# Patient Record
Sex: FEMALE | Race: WHITE | NOT HISPANIC OR LATINO | Employment: OTHER | URBAN - NONMETROPOLITAN AREA
[De-identification: names, ages, dates, MRNs, and addresses within clinical notes are randomized per-mention and may not be internally consistent; named-entity substitution may affect disease eponyms.]

---

## 2017-04-10 ENCOUNTER — TELEPHONE (OUTPATIENT)
Dept: FAMILY MEDICINE CLINIC | Facility: CLINIC | Age: 42
End: 2017-04-10

## 2017-04-10 NOTE — TELEPHONE ENCOUNTER
----- Message from David Magdaleno sent at 4/10/2017  1:34 PM CDT -----  Contact: [phone]  PATIENT HAS THRUSH IN MOUTH FROM ANTIBIOTICS AND WANTS TO KNOW IF YOU WILL CALL IN FABIANA CORONA

## 2017-05-12 ENCOUNTER — HOSPITAL ENCOUNTER (OUTPATIENT)
Facility: HOSPITAL | Age: 42
Setting detail: HOSPITAL OUTPATIENT SURGERY
End: 2017-05-12
Attending: SURGERY | Admitting: SURGERY

## 2017-05-12 ENCOUNTER — APPOINTMENT (OUTPATIENT)
Dept: PREADMISSION TESTING | Facility: HOSPITAL | Age: 42
End: 2017-05-12

## 2017-05-12 ENCOUNTER — CONSULT (OUTPATIENT)
Dept: SURGERY | Facility: CLINIC | Age: 42
End: 2017-05-12

## 2017-05-12 VITALS
OXYGEN SATURATION: 100 % | WEIGHT: 210 LBS | HEIGHT: 63 IN | RESPIRATION RATE: 18 BRPM | BODY MASS INDEX: 37.21 KG/M2 | HEART RATE: 88 BPM | SYSTOLIC BLOOD PRESSURE: 120 MMHG | DIASTOLIC BLOOD PRESSURE: 74 MMHG

## 2017-05-12 VITALS
WEIGHT: 213 LBS | DIASTOLIC BLOOD PRESSURE: 78 MMHG | BODY MASS INDEX: 39.2 KG/M2 | SYSTOLIC BLOOD PRESSURE: 124 MMHG | HEIGHT: 62 IN

## 2017-05-12 DIAGNOSIS — K80.12 CALCULUS OF GALLBLADDER WITH ACUTE ON CHRONIC CHOLECYSTITIS WITHOUT OBSTRUCTION: Primary | ICD-10-CM

## 2017-05-12 DIAGNOSIS — K80.12 CALCULUS OF GALLBLADDER WITH ACUTE ON CHRONIC CHOLECYSTITIS WITHOUT OBSTRUCTION: ICD-10-CM

## 2017-05-12 LAB
ALBUMIN SERPL-MCNC: 4 G/DL (ref 3.4–4.8)
ALBUMIN/GLOB SERPL: 1.4 G/DL (ref 1.1–1.8)
ALP SERPL-CCNC: 180 U/L (ref 38–126)
ALT SERPL W P-5'-P-CCNC: 186 U/L (ref 9–52)
ANION GAP SERPL CALCULATED.3IONS-SCNC: 9 MMOL/L (ref 5–15)
AST SERPL-CCNC: 39 U/L (ref 14–36)
BILIRUB SERPL-MCNC: 0.3 MG/DL (ref 0.2–1.3)
BUN BLD-MCNC: 11 MG/DL (ref 7–21)
BUN/CREAT SERPL: 11.7 (ref 7–25)
CALCIUM SPEC-SCNC: 9.1 MG/DL (ref 8.4–10.2)
CHLORIDE SERPL-SCNC: 105 MMOL/L (ref 95–110)
CO2 SERPL-SCNC: 24 MMOL/L (ref 22–31)
CREAT BLD-MCNC: 0.94 MG/DL (ref 0.5–1)
GFR SERPL CREATININE-BSD FRML MDRD: 65 ML/MIN/1.73 (ref 58–135)
GLOBULIN UR ELPH-MCNC: 2.9 GM/DL (ref 2.3–3.5)
GLUCOSE BLD-MCNC: 93 MG/DL (ref 60–100)
POTASSIUM BLD-SCNC: 4.2 MMOL/L (ref 3.5–5.1)
PROT SERPL-MCNC: 6.9 G/DL (ref 6.3–8.6)
SODIUM BLD-SCNC: 138 MMOL/L (ref 137–145)

## 2017-05-12 PROCEDURE — 80053 COMPREHEN METABOLIC PANEL: CPT | Performed by: SURGERY

## 2017-05-12 PROCEDURE — 93010 ELECTROCARDIOGRAM REPORT: CPT | Performed by: INTERNAL MEDICINE

## 2017-05-12 PROCEDURE — 99203 OFFICE O/P NEW LOW 30 MIN: CPT | Performed by: SURGERY

## 2017-05-12 PROCEDURE — 36415 COLL VENOUS BLD VENIPUNCTURE: CPT

## 2017-05-12 PROCEDURE — 93005 ELECTROCARDIOGRAM TRACING: CPT

## 2017-05-12 RX ORDER — VALACYCLOVIR HYDROCHLORIDE 1 G/1
500 TABLET, FILM COATED ORAL 2 TIMES DAILY PRN
COMMUNITY
Start: 2013-03-05

## 2017-05-12 RX ORDER — BUPROPION HYDROCHLORIDE 300 MG/1
300 TABLET ORAL DAILY
COMMUNITY
End: 2018-09-17 | Stop reason: SDUPTHER

## 2017-05-12 RX ORDER — CLONAZEPAM 2 MG/1
2 TABLET ORAL 2 TIMES DAILY PRN
Refills: 3 | COMMUNITY
Start: 2017-04-11 | End: 2019-12-04

## 2017-05-12 RX ORDER — SODIUM CHLORIDE, SODIUM LACTATE, POTASSIUM CHLORIDE, CALCIUM CHLORIDE 600; 310; 30; 20 MG/100ML; MG/100ML; MG/100ML; MG/100ML
100 INJECTION, SOLUTION INTRAVENOUS CONTINUOUS
Status: CANCELLED | OUTPATIENT
Start: 2017-05-16

## 2017-05-12 RX ORDER — IBUPROFEN 600 MG/1
600 TABLET ORAL EVERY 8 HOURS PRN
COMMUNITY
End: 2017-11-15 | Stop reason: SDUPTHER

## 2017-05-12 RX ORDER — DICYCLOMINE HCL 20 MG
20 TABLET ORAL EVERY 6 HOURS
COMMUNITY
End: 2017-05-12

## 2017-05-12 RX ORDER — TEMAZEPAM 15 MG/1
15 CAPSULE ORAL NIGHTLY PRN
Refills: 3 | COMMUNITY
Start: 2017-04-11 | End: 2019-12-04

## 2017-05-12 RX ORDER — ACYCLOVIR 50 MG/G
CREAM TOPICAL AS NEEDED
COMMUNITY
End: 2017-07-21

## 2017-05-12 RX ORDER — SODIUM CHLORIDE 0.9 % (FLUSH) 0.9 %
1-10 SYRINGE (ML) INJECTION AS NEEDED
Status: CANCELLED | OUTPATIENT
Start: 2017-05-16

## 2017-05-16 ENCOUNTER — OFFICE VISIT (OUTPATIENT)
Dept: FAMILY MEDICINE CLINIC | Facility: CLINIC | Age: 42
End: 2017-05-16

## 2017-05-16 VITALS
BODY MASS INDEX: 37.39 KG/M2 | HEART RATE: 92 BPM | WEIGHT: 211 LBS | OXYGEN SATURATION: 97 % | DIASTOLIC BLOOD PRESSURE: 76 MMHG | TEMPERATURE: 97.2 F | SYSTOLIC BLOOD PRESSURE: 120 MMHG | HEIGHT: 63 IN

## 2017-05-16 DIAGNOSIS — R23.4 SCAB: Primary | ICD-10-CM

## 2017-05-16 DIAGNOSIS — F41.9 ANXIETY: ICD-10-CM

## 2017-05-16 PROCEDURE — 99213 OFFICE O/P EST LOW 20 MIN: CPT | Performed by: FAMILY MEDICINE

## 2017-05-16 RX ORDER — CEPHALEXIN 250 MG/1
250 CAPSULE ORAL 4 TIMES DAILY
Qty: 20 CAPSULE | Refills: 0 | Status: SHIPPED | OUTPATIENT
Start: 2017-05-16 | End: 2017-07-21

## 2017-06-05 ENCOUNTER — TELEPHONE (OUTPATIENT)
Dept: FAMILY MEDICINE CLINIC | Facility: CLINIC | Age: 42
End: 2017-06-05

## 2017-06-05 NOTE — TELEPHONE ENCOUNTER
PATIENT SAW DR GRIJALVA ABOUT GALLBLADDER. NEEDS CARIOLOGY CLEARANCE FOR SURGERY. APPOINTMENT WITH [facility] IS 6-24-17.    IN A LOT OF PAIN. WHAT SHOULD SHE DO UNTIL THEN?

## 2017-06-19 ENCOUNTER — APPOINTMENT (OUTPATIENT)
Dept: LAB | Facility: HOSPITAL | Age: 42
End: 2017-06-19

## 2017-06-19 ENCOUNTER — PROCEDURE VISIT (OUTPATIENT)
Dept: OBSTETRICS AND GYNECOLOGY | Facility: CLINIC | Age: 42
End: 2017-06-19

## 2017-06-19 VITALS
DIASTOLIC BLOOD PRESSURE: 78 MMHG | SYSTOLIC BLOOD PRESSURE: 118 MMHG | HEIGHT: 63 IN | BODY MASS INDEX: 38.98 KG/M2 | WEIGHT: 220 LBS

## 2017-06-19 DIAGNOSIS — Z11.4 ENCOUNTER FOR SCREENING FOR HIV: ICD-10-CM

## 2017-06-19 DIAGNOSIS — Z11.3 SCREENING EXAMINATION FOR STD (SEXUALLY TRANSMITTED DISEASE): Primary | ICD-10-CM

## 2017-06-19 DIAGNOSIS — R63.5 WEIGHT GAIN: Primary | ICD-10-CM

## 2017-06-19 DIAGNOSIS — R23.2 HOT FLASHES: ICD-10-CM

## 2017-06-19 DIAGNOSIS — Z12.31 ENCOUNTER FOR SCREENING MAMMOGRAM FOR MALIGNANT NEOPLASM OF BREAST: ICD-10-CM

## 2017-06-19 LAB
CANDIDA ALBICANS: NEGATIVE
GARDNERELLA VAGINALIS: POSITIVE
TRICHOMONAS VAGINALIS PCR: NEGATIVE
TSH SERPL DL<=0.05 MIU/L-ACNC: 1.9 MIU/ML (ref 0.46–4.68)

## 2017-06-19 PROCEDURE — 87510 GARDNER VAG DNA DIR PROBE: CPT | Performed by: NURSE PRACTITIONER

## 2017-06-19 PROCEDURE — 87340 HEPATITIS B SURFACE AG IA: CPT | Performed by: NURSE PRACTITIONER

## 2017-06-19 PROCEDURE — 87591 N.GONORRHOEAE DNA AMP PROB: CPT | Performed by: NURSE PRACTITIONER

## 2017-06-19 PROCEDURE — 86592 SYPHILIS TEST NON-TREP QUAL: CPT | Performed by: NURSE PRACTITIONER

## 2017-06-19 PROCEDURE — 36415 COLL VENOUS BLD VENIPUNCTURE: CPT | Performed by: NURSE PRACTITIONER

## 2017-06-19 PROCEDURE — 84443 ASSAY THYROID STIM HORMONE: CPT | Performed by: NURSE PRACTITIONER

## 2017-06-19 PROCEDURE — G0432 EIA HIV-1/HIV-2 SCREEN: HCPCS | Performed by: NURSE PRACTITIONER

## 2017-06-19 PROCEDURE — 99213 OFFICE O/P EST LOW 20 MIN: CPT | Performed by: NURSE PRACTITIONER

## 2017-06-19 PROCEDURE — 86696 HERPES SIMPLEX TYPE 2 TEST: CPT | Performed by: NURSE PRACTITIONER

## 2017-06-19 PROCEDURE — 87660 TRICHOMONAS VAGIN DIR PROBE: CPT | Performed by: NURSE PRACTITIONER

## 2017-06-19 PROCEDURE — 86803 HEPATITIS C AB TEST: CPT | Performed by: NURSE PRACTITIONER

## 2017-06-19 PROCEDURE — 87480 CANDIDA DNA DIR PROBE: CPT | Performed by: NURSE PRACTITIONER

## 2017-06-19 PROCEDURE — 86695 HERPES SIMPLEX TYPE 1 TEST: CPT | Performed by: NURSE PRACTITIONER

## 2017-06-19 PROCEDURE — 87491 CHLMYD TRACH DNA AMP PROBE: CPT | Performed by: NURSE PRACTITIONER

## 2017-06-19 RX ORDER — METRONIDAZOLE 500 MG/1
500 TABLET ORAL 2 TIMES DAILY
Qty: 14 TABLET | Refills: 0 | Status: SHIPPED | OUTPATIENT
Start: 2017-06-19 | End: 2017-06-26

## 2017-06-19 NOTE — PROGRESS NOTES
"Subjective   History of Present Illness    Chioma Lin is a 41 y.o. female who presents for STI testing. She has not had any new sexual partners but unsure of her partner's faithfulness. She denies vaginal discharge, irritation, pruritus. She is also having worsened hot flashes since the previous visit one year ago. They are infrequent, but the Hot flashes are so bad sometimes that they induce nausea. She is weary of the risks involved with HRT, and currently already taking zoloft daily. She does not desire to begin HRT at this time.       Current contraception: Tubal ligation  Sexually active?: Yes  Postmenopausal?: Yes  HRT?: no  Hysterectomy?: no    Date last pap:   History of abnormal Pap smear: no  Date of last mammogram: never  History of abnormal mammogram: no    /78  Ht 63\" (160 cm)  Wt 220 lb (99.8 kg)  LMP  (LMP Unknown)  Breastfeeding? No  BMI 38.97 kg/m2    Past Medical History:   Diagnosis Date   • Abdominal pain    • Acid reflux    • Acute sinusitis    • Allergic rhinitis     Due to Pollen   • Asthma    • Astigmatism    • Cardiomyopathy     post partum   • CHF (congestive heart failure)    • Chronic pain    • Common cold    • Constipation    • Depressive disorder    • Diarrhea    • Dysfunction of eustachian tube    • Encounter for gynecological examination with abnormal finding    • Encounter for screening for infections with predominantly sexual mode of transmission    • Encounter for screening mammogram for malignant neoplasm of breast    • Excessive cerumen in ear canal    • Hypertensive disorder     hx   • Irregular menstruation    • Menopausal flushing    • Mood disorder     Severe   • Myopia    • Nausea and vomiting    • Otalgia     Unspecified ear   • Presbyopia    • Sebaceous cyst    • Vaginal discharge    • Vaginal odor    • Venereal disease screening        Past Surgical History:   Procedure Laterality Date   •  SECTION  2005   • INJECTION OF " MEDICATION  04/04/2016    Phenergan   • OTHER SURGICAL HISTORY  11/25/2013    Removal of impacted cerumen   • PAP SMEAR  2013   • TUBAL ABDOMINAL LIGATION         The following portions of the patient's history were reviewed and updated as appropriate: allergies, current medications, past family history, past medical history, past social history, past surgical history and problem list.    Review of Systems    Constitutional:  No fatigue, No weight loss, No weight gain, No fever and No chills   Respiratory: No dyspnea, No cough, No hemopytysis, No wheezing and No pleuritic pain   Cardiovascular: No chest pain, No palpitations, No arrhythmia, No orthopnea, No noctural dyspnea, No edema and No claudication   Breasts: No discharge from nipple, No breast tenderness and No breast mass   Gastrointestinal: No loss of appetite, No dysphagia, No abdominal pain, No nausea, No vomiting, No change in bowel habits, No diarrhea, No constipation and No blood in stool   Genitourinary: No increased frequency of urination, No dysuria, No hematuria, No nocturia, No urinary incontinence, No vaginal discharge, No abnormal vaginal bleeding, No pelvic pain, No menstrual problem and Menopausal problem   Skin: No skin rash, No skin lesion, No dry skin, No pruritus and No nail problem   Neurologic: No headache, No dizziness, No lightheadedness, No syncope, No vertigo, No weakness, No numbness, No tremor and No paresthesia   Psychiatric: No difficulty sleeping, No mood swings, No feeling anxious, No confusion and No memory loss          Objective   Physical Exam    General:  Alert and oriented x 3, Cooperative, Well developed & well nourished and No acute distress   Genitourinary: Vulva normal, vaginal mucosa normal, bladder nondistended and nontender, cervix normal, uterus normal size and nontender, no adnexal/pelvic tenderness or masses, Bartholin's/[place]/Urethral gland WNL, Vaginal discharge   Skin: Skin warm and dry and Pattern of hair  growth normal       Assessment/Plan   Chioma was seen today for gynecologic exam and personal problem.    Diagnoses and all orders for this visit:    Screening examination for STD (sexually transmitted disease)  -     Gardnerella vaginalis, Trichomonas vaginalis, Candida albicans, PCR  -     Chlamydia trachomatis, Neisseria gonorrhoeae, PCR  -     Hepatitis B Surface Antigen  -     Hepatitis C Antibody  -     HIV-1 & HIV-2 Antibodies  -     HSV 1 & 2 - Specific Antibody, IgG  -     RPR    Encounter for screening for HIV   -     HIV-1 & HIV-2 Antibodies    Hot flashes    Encounter for screening mammogram for malignant neoplasm of breast  -     Mammo Screening Digital Tomosynthesis Bilateral With CAD; Future      All questions answered.  Labs today.  Will rule out vaginal infections.  Mammogram.  Discussed sexual activity and couseled on sexual health. Recommended condoms for prevention of sexually transmitted diseases.  Follow up in 1 year.

## 2017-06-20 LAB
HSV1 IGG SER IA-ACNC: 29.5 INDEX (ref 0–0.9)
HSV2 IGG SER IA-ACNC: 10.4 INDEX (ref 0–0.9)

## 2017-06-21 LAB
C TRACH RRNA CVX QL NAA+PROBE: NOT DETECTED
HBV SURFACE AG SERPL QL IA: NEGATIVE
HCV AB SER DONR QL: NEGATIVE
HIV1+2 AB SER QL: NEGATIVE
N GONORRHOEA RRNA SPEC QL NAA+PROBE: NOT DETECTED

## 2017-06-23 LAB — RPR SER QL: NORMAL

## 2017-06-29 ENCOUNTER — TELEPHONE (OUTPATIENT)
Dept: SURGERY | Facility: CLINIC | Age: 42
End: 2017-06-29

## 2017-06-29 NOTE — TELEPHONE ENCOUNTER
PT WAS WAITING ON CLEARANCE FROM DR BAKER FOR HER OWN [facility].   HE HAS CLEARED HER AND SHE WOULD LIKE TO [facility] SURGERY IF DR GRIJALVA WILL DO IT

## 2017-07-06 ENCOUNTER — TELEPHONE (OUTPATIENT)
Dept: FAMILY MEDICINE CLINIC | Facility: CLINIC | Age: 42
End: 2017-07-06

## 2017-07-06 RX ORDER — DICYCLOMINE HCL 20 MG
20 TABLET ORAL EVERY 6 HOURS
Qty: 120 TABLET | Refills: 0 | Status: SHIPPED | OUTPATIENT
Start: 2017-07-06 | End: 2017-08-05 | Stop reason: SDUPTHER

## 2017-07-06 RX ORDER — OMEPRAZOLE 40 MG/1
40 CAPSULE, DELAYED RELEASE ORAL DAILY
Qty: 30 CAPSULE | Refills: 0 | Status: SHIPPED | OUTPATIENT
Start: 2017-07-06 | End: 2017-08-05 | Stop reason: SDUPTHER

## 2017-07-06 NOTE — TELEPHONE ENCOUNTER
PT SEES DR. GRIJALVA ON July 17 FOR GALLBLADDER AND WANTS TO KNOW IF YOU WILL SEND IN RX FOR BENTYL 20 MG, 1 Q 6H PRN AND REFILL  PRILOSEC 40 MG, 1 QD  SHE WAS TOLD TO CONTACT PCP FOR THIS.  CHLOE MONTENEGRO, CALL AND LET PT KNOW

## 2017-07-21 ENCOUNTER — OFFICE VISIT (OUTPATIENT)
Dept: SURGERY | Facility: CLINIC | Age: 42
End: 2017-07-21

## 2017-07-21 ENCOUNTER — APPOINTMENT (OUTPATIENT)
Dept: PREADMISSION TESTING | Facility: HOSPITAL | Age: 42
End: 2017-07-21

## 2017-07-21 VITALS
BODY MASS INDEX: 37.21 KG/M2 | HEIGHT: 63 IN | WEIGHT: 210 LBS | SYSTOLIC BLOOD PRESSURE: 102 MMHG | DIASTOLIC BLOOD PRESSURE: 74 MMHG

## 2017-07-21 DIAGNOSIS — K80.20 GALLSTONES: ICD-10-CM

## 2017-07-21 DIAGNOSIS — K80.20 GALLSTONES: Primary | ICD-10-CM

## 2017-07-21 LAB
ALBUMIN SERPL-MCNC: 4.3 G/DL (ref 3.4–4.8)
ALBUMIN/GLOB SERPL: 1.3 G/DL (ref 1.1–1.8)
ALP SERPL-CCNC: 110 U/L (ref 38–126)
ALT SERPL W P-5'-P-CCNC: 34 U/L (ref 9–52)
ANION GAP SERPL CALCULATED.3IONS-SCNC: 10 MMOL/L (ref 5–15)
AST SERPL-CCNC: 24 U/L (ref 14–36)
BILIRUB SERPL-MCNC: 0.3 MG/DL (ref 0.2–1.3)
BUN BLD-MCNC: 11 MG/DL (ref 7–21)
BUN/CREAT SERPL: 11.6 (ref 7–25)
CALCIUM SPEC-SCNC: 8.7 MG/DL (ref 8.4–10.2)
CHLORIDE SERPL-SCNC: 102 MMOL/L (ref 95–110)
CO2 SERPL-SCNC: 24 MMOL/L (ref 22–31)
CREAT BLD-MCNC: 0.95 MG/DL (ref 0.5–1)
GFR SERPL CREATININE-BSD FRML MDRD: 64 ML/MIN/1.73 (ref 58–135)
GLOBULIN UR ELPH-MCNC: 3.3 GM/DL (ref 2.3–3.5)
GLUCOSE BLD-MCNC: 91 MG/DL (ref 60–100)
POTASSIUM BLD-SCNC: 4.4 MMOL/L (ref 3.5–5.1)
PROT SERPL-MCNC: 7.6 G/DL (ref 6.3–8.6)
SODIUM BLD-SCNC: 136 MMOL/L (ref 137–145)

## 2017-07-21 PROCEDURE — 80053 COMPREHEN METABOLIC PANEL: CPT | Performed by: SURGERY

## 2017-07-21 PROCEDURE — 36415 COLL VENOUS BLD VENIPUNCTURE: CPT

## 2017-07-21 PROCEDURE — 99213 OFFICE O/P EST LOW 20 MIN: CPT | Performed by: SURGERY

## 2017-07-21 RX ORDER — SODIUM CHLORIDE 0.9 % (FLUSH) 0.9 %
1-10 SYRINGE (ML) INJECTION AS NEEDED
Status: CANCELLED | OUTPATIENT
Start: 2017-07-25

## 2017-07-21 RX ORDER — SODIUM CHLORIDE, SODIUM LACTATE, POTASSIUM CHLORIDE, CALCIUM CHLORIDE 600; 310; 30; 20 MG/100ML; MG/100ML; MG/100ML; MG/100ML
100 INJECTION, SOLUTION INTRAVENOUS CONTINUOUS
Status: CANCELLED | OUTPATIENT
Start: 2017-07-25

## 2017-07-21 NOTE — PROGRESS NOTES
Chief Complaint   Patient presents with   • Follow-up     Recheck abdominal pain.   • Nausea        HPI  This woman has been seen on multiple occasions for right upper quadrant abdominal pain and gallstones.  She has been scheduled on 2 separate occasions for cholecystectomy but has canceled each time.  She's had persistent pain in the right upper quadrant that she describes as sharp and associated with nausea. Pertinent negatives include no anorexia, arthralgias, constipation, diarrhea, dysuria, fever, frequency, headaches, hematuria, myalgias or vomiting. Nothing aggravates the pain. The pain is relieved by nothing.     Past Medical History:   Diagnosis Date   • Abdominal pain    • Acid reflux                  • Asthma    • Astigmatism    • Cardiomyopathy     post partum   • CHF (congestive heart failure)    • Chronic pain         • Constipation    • Depressive disorder    • Diarrhea    • Dysfunction of eustachian tube                        • Hypertensive disorder     hx   • Irregular menstruation    • Menopausal flushing    • Mood disorder     Severe   • Myopia         • Otalgia     Unspecified ear   • Presbyopia                            Past Surgical History:   Procedure Laterality Date   •  SECTION  2005             • OTHER SURGICAL HISTORY  2013    Removal of impacted cerumen         • TUBAL ABDOMINAL LIGATION           Current Outpatient Prescriptions:   •  acyclovir (ZOVIRAX) 5 % cream, Apply  topically As Needed., Disp: , Rfl:   •  albuterol (PROVENTIL HFA;VENTOLIN HFA) 108 (90 BASE) MCG/ACT inhaler, Inhale 2 puffs every 4 (four) hours as needed for wheezing., Disp: 1 inhaler, Rfl: 11  •  albuterol (PROVENTIL) (2.5 MG/3ML) 0.083% nebulizer solution, Take 2.5 mg by nebulization every 4 (four) hours as needed for wheezing or shortness of air., Disp: 120 vial, Rfl: 11  •  aspirin 81 MG tablet, Take 81 mg by mouth Daily., Disp: , Rfl:   •  buPROPion XL (WELLBUTRIN XL) 300 MG 24 hr  tablet, Take 300 mg by mouth Daily., Disp: , Rfl:   •  carvedilol (COREG) 6.25 MG tablet, Take 6.25 mg by mouth 2 (Two) Times a Day With Meals., Disp: , Rfl:   •  clonazePAM (KlonoPIN) 2 MG tablet, Take 2 mg by mouth 2 (Two) Times a Day As Needed., Disp: , Rfl: 3  •  dicyclomine (BENTYL) 20 MG tablet, Take 1 tablet by mouth Every 6 (Six) Hours., Disp: 120 tablet, Rfl: 0  •  fluticasone (FLONASE) 50 MCG/ACT nasal spray, 2 sprays into each nostril Daily As Needed. Administer 2 sprays in each nostril for each dose. , Disp: , Rfl:   •  fluticasone-salmeterol (ADVAIR HFA) 115-21 MCG/ACT inhaler, Inhale 2 puffs 2 (Two) Times a Day. (Patient taking differently: Inhale 2 puffs 2 (Two) Times a Day As Needed.), Disp: 1 inhaler, Rfl: 11  •  ibuprofen (ADVIL,MOTRIN) 600 MG tablet, Take 600 mg by mouth Every 8 (Eight) Hours As Needed for Mild Pain (1-3)., Disp: , Rfl:   •  omeprazole (priLOSEC) 40 MG capsule, Take 1 capsule by mouth Daily., Disp: 30 capsule, Rfl: 0  •  QUEtiapine XR (SEROquel XR) 400 MG 24 hr tablet, Take 400 mg by mouth every night., Disp: , Rfl:   •  SERTRALINE HCL PO, Take 100 mg by mouth. Take 2 every day, Disp: , Rfl:   •  temazepam (RESTORIL) 15 MG capsule, Take 15 mg by mouth As Needed., Disp: , Rfl: 3  •  traMADol-acetaminophen (ULTRACET) 37.5-325 MG per tablet, TAKE 1 TABLET BY MOUTH EVERY 6 HOURS AS NEEDED, Disp: , Rfl: 0  •  valACYclovir (VALTREX) 1000 MG tablet, Take 500 mg by mouth 2 (Two) Times a Day., Disp: , Rfl:     Allergies   Allergen Reactions   • Adhesive Tape Rash   • Paxil [Paroxetine] Anxiety       Family History   Problem Relation Age of Onset   • Cancer Other    • COPD Other    • Diabetes Other    • Heart disease Other    • Hypertension Other    • Mental illness Other    • Osteoarthritis Other    • Stomach cancer Other    • Thyroid disease Other    • Depression Other    • Other Other      Black Lung       Social History     Social History   • Marital status:      Spouse name:  N/A   • Number of children: N/A   • Years of education: N/A     Occupational History   • Not on file.     Social History Main Topics   • Smoking status: Light Tobacco Smoker     Types: Cigarettes, Electronic Cigarette     Last attempt to quit: 2015   • Smokeless tobacco: Never Used      Comment: 3 cigarettes per day. Electronic Cigarette daily.       Review of Systems  No changes since last visit  Physical Exam   Constitutional: She is oriented to person, place, and time. She appears well-developed and well-nourished. No distress.   HENT:   Head: Normocephalic and atraumatic.   Eyes: Conjunctivae and EOM are normal. Pupils are equal, round, and reactive to light.   Neck: Normal range of motion. Neck supple. No JVD present. No tracheal deviation present. No thyromegaly present.   Cardiovascular: Normal rate, regular rhythm, normal heart sounds and intact distal pulses.    No murmur heard.  Pulmonary/Chest: Effort normal and breath sounds normal. No stridor. No respiratory distress. She has no wheezes. She has no rales. She exhibits no tenderness.   Abdominal: Soft. Bowel sounds are normal. She exhibits no distension and no mass. There is tenderness (right upper quadrant). There is no rebound and no guarding. No hernia.   Musculoskeletal: Normal range of motion. She exhibits no edema or deformity.   Lymphadenopathy:     She has no cervical adenopathy.   Neurological: She is alert and oriented to person, place, and time.   Skin: Skin is warm and dry. No rash noted. She is not diaphoretic. No erythema. No pallor.   Psychiatric: She has a normal mood and affect. Her behavior is normal. Judgment and thought content normal.   Vitals reviewed.        ASSESSMENT    Chioma was seen today for follow-up and nausea.    Diagnoses and all orders for this visit:    Gallstones  -     Comprehensive Metabolic Panel; Future  -     sodium chloride 0.9 % flush 1-10 mL; Infuse 1-10 mL into a venous catheter As Needed for Line Care.  -      lactated ringers infusion; Infuse 100 mL/hr into a venous catheter Continuous.  -     ceFAZolin (ANCEF) 2 g in sodium chloride 0.9 % 100 mL IVPB; Infuse 2 g into a venous catheter 1 (One) Time.  -     Case Request; Standing  -     Case Request    Other orders  -     Provide instructions to patient on NPO status  -     Follow anesthesia standing orders.; Standing  -     Insert Peripheral IV; Standing  -     Saline Lock & Maintain IV Access; Standing  -     Verify NPO Status; Standing  -     Obtain informed consent; Standing  -     ZIA hose- To be placed on patient in pre-op; Standing  -     SCD (sequential compression device)- to be placed on patient in Pre-op; Standing        PLAN    1.  Laparoscopic possible open cholecystectomy with cholangiogram is planned.  I have informed the patient that this is the last time we will schedule her she cancels without calling.    This procedure involves removing the gallbladder.  The surgery may be done laparoscopically. Laparoscopic surgery is done using a scope and hollow tube(s) called ports. These are inserted through small cuts in the abdomen. A scope is a thin, lighted instrument with a camera attached. Tools are passed through the ports. Carbon dioxide gas is pumped into the abdomen to create workspace.   If the surgery cannot be performed with a scope, it may be done through a larger cut. This occurs 2% of the time.  The gall bladder will be removed after it is  from the liver, bile duct, and surrounding arteries. An x-ray of the bile ducts is usually performed with contrast dye injected into the ducts.  If stones are found, another procedure may be required to remove them.  A drain may rarely be inserted to keep fluid from building up in the treatment area.   Alternatives include:  * Watching and waiting with no surgery  * Removing the gallbladder through one large incision made in the abdomen.  Prognosis and possible risks if no operation/procedure is  performed:  Pain, infection, inflammation, and/or stones may continue or get worse.  Risks of the operation/procedure:  * Exposure to radiation. Pregnant women and women of childbearing age should talk with their doctor about this.  * Pain, numbness, swelling, weakness or scarring where tissue is cut.  * The gas used in laparoscopic procedures to inflate the abdomen can become trapped in tissues. Gas in the bloodstream can dangerously affect blood flow and  heart function.  * The procedure may not cure or relieve your condition or symptoms. They may come back and even worsen.  * You may need additional tests or treatment.  * Bleeding. You may need blood transfusions or other treatments. This may be discovered during the procedure, or later.  * Embolism. An embolism is an object that moves through your body in your bloodstream. It can be an air bubble, a blood clot, a piece of fat or other material. It can block a blood vessel. This can lead to stroke, pulmonary embolism (blockage of the main artery of the lung), or injury to organs or extremities.  * Reactions to dye used for imaging. These may include hives, swelling of the face and/or throat, difficulty breathing, and kidney failure.  * Retained stones in bile ducts.  * Wound infection, poor healing or reopening. Blood or clear fluid can also collect at the wound site(s).  * Damage to the bile ducts or nearby structures. This may be discovered during the procedure, or later.  * Incisional hernia. Weak scar tissue may allow tissue to bulge through the cut.  * The instrument(s) placed in your abdomen can cause injuries to nearby structures.  * Death.  Expectations:  * This procedure may relieve or prevent infection, inflammation, and/or pain from stones or blockage of bile ducts.  * If you are on metformin, it will need to be held for 48 hours after surgery.    Signed by Cornell Garay MD            This document has been electronically signed by Cornell Garay MD on  July 22, 2017 7:08 PM

## 2017-07-25 ENCOUNTER — HOSPITAL ENCOUNTER (OUTPATIENT)
Facility: HOSPITAL | Age: 42
Discharge: HOME | End: 2017-07-26
Attending: SURGERY | Admitting: SURGERY

## 2017-07-25 ENCOUNTER — APPOINTMENT (OUTPATIENT)
Dept: GENERAL RADIOLOGY | Facility: HOSPITAL | Age: 42
End: 2017-07-25

## 2017-07-25 ENCOUNTER — ANESTHESIA EVENT (OUTPATIENT)
Dept: PERIOP | Facility: HOSPITAL | Age: 42
End: 2017-07-25

## 2017-07-25 ENCOUNTER — ANESTHESIA (OUTPATIENT)
Dept: PERIOP | Facility: HOSPITAL | Age: 42
End: 2017-07-25

## 2017-07-25 DIAGNOSIS — K80.20 GALLSTONES: ICD-10-CM

## 2017-07-25 PROCEDURE — 47563 LAPARO CHOLECYSTECTOMY/GRAPH: CPT | Performed by: SURGERY

## 2017-07-25 PROCEDURE — 63710000001 CLONAZEPAM 0.5 MG TABLET: Performed by: SURGERY

## 2017-07-25 PROCEDURE — 74300 X-RAY BILE DUCTS/PANCREAS: CPT | Performed by: SURGERY

## 2017-07-25 PROCEDURE — A9270 NON-COVERED ITEM OR SERVICE: HCPCS | Performed by: SURGERY

## 2017-07-25 PROCEDURE — 94799 UNLISTED PULMONARY SVC/PX: CPT

## 2017-07-25 PROCEDURE — 25010000002 NEOSTIGMINE PER 0.5 MG: Performed by: NURSE ANESTHETIST, CERTIFIED REGISTERED

## 2017-07-25 PROCEDURE — 25010000002 MIDAZOLAM PER 1 MG: Performed by: NURSE ANESTHETIST, CERTIFIED REGISTERED

## 2017-07-25 PROCEDURE — 25010000003 CEFAZOLIN PER 500 MG: Performed by: SURGERY

## 2017-07-25 PROCEDURE — 25010000002 PROPOFOL 10 MG/ML EMULSION: Performed by: NURSE ANESTHETIST, CERTIFIED REGISTERED

## 2017-07-25 PROCEDURE — 88304 TISSUE EXAM BY PATHOLOGIST: CPT | Performed by: SURGERY

## 2017-07-25 PROCEDURE — 25010000002 SUCCINYLCHOLINE PER 20 MG: Performed by: NURSE ANESTHETIST, CERTIFIED REGISTERED

## 2017-07-25 PROCEDURE — 25010000002 FENTANYL CITRATE (PF) 100 MCG/2ML SOLUTION: Performed by: NURSE ANESTHETIST, CERTIFIED REGISTERED

## 2017-07-25 PROCEDURE — 25010000002 ONDANSETRON PER 1 MG: Performed by: NURSE ANESTHETIST, CERTIFIED REGISTERED

## 2017-07-25 PROCEDURE — 63710000001 HYDROCODONE-ACETAMINOPHEN 7.5-325 MG TABLET: Performed by: SURGERY

## 2017-07-25 PROCEDURE — 25010000002 PHENYLEPHRINE PER 1 ML: Performed by: NURSE ANESTHETIST, CERTIFIED REGISTERED

## 2017-07-25 PROCEDURE — 63710000001 DICYCLOMINE PER 20 MG: Performed by: SURGERY

## 2017-07-25 PROCEDURE — 76000 FLUOROSCOPY <1 HR PHYS/QHP: CPT

## 2017-07-25 PROCEDURE — 63710000001 CARVEDILOL 6.25 MG TABLET: Performed by: SURGERY

## 2017-07-25 PROCEDURE — 0 IOPAMIDOL 61 % SOLUTION: Performed by: SURGERY

## 2017-07-25 PROCEDURE — 88304 TISSUE EXAM BY PATHOLOGIST: CPT | Performed by: PATHOLOGY

## 2017-07-25 PROCEDURE — 25010000002 HYDROMORPHONE PER 4 MG: Performed by: NURSE ANESTHETIST, CERTIFIED REGISTERED

## 2017-07-25 RX ORDER — ONDANSETRON 2 MG/ML
INJECTION INTRAMUSCULAR; INTRAVENOUS AS NEEDED
Status: DISCONTINUED | OUTPATIENT
Start: 2017-07-25 | End: 2017-07-25 | Stop reason: SURG

## 2017-07-25 RX ORDER — ACETAMINOPHEN 650 MG/1
650 SUPPOSITORY RECTAL ONCE AS NEEDED
Status: DISCONTINUED | OUTPATIENT
Start: 2017-07-25 | End: 2017-07-25

## 2017-07-25 RX ORDER — NALOXONE HCL 0.4 MG/ML
0.2 VIAL (ML) INJECTION AS NEEDED
Status: DISCONTINUED | OUTPATIENT
Start: 2017-07-25 | End: 2017-07-25

## 2017-07-25 RX ORDER — ALBUTEROL SULFATE 2.5 MG/3ML
2.5 SOLUTION RESPIRATORY (INHALATION) EVERY 4 HOURS PRN
Status: DISCONTINUED | OUTPATIENT
Start: 2017-07-25 | End: 2017-07-26 | Stop reason: HOSPADM

## 2017-07-25 RX ORDER — GLYCOPYRROLATE 0.2 MG/ML
INJECTION INTRAMUSCULAR; INTRAVENOUS AS NEEDED
Status: DISCONTINUED | OUTPATIENT
Start: 2017-07-25 | End: 2017-07-25 | Stop reason: SURG

## 2017-07-25 RX ORDER — HYDROCODONE BITARTRATE AND ACETAMINOPHEN 7.5; 325 MG/1; MG/1
1 TABLET ORAL EVERY 4 HOURS PRN
Status: DISCONTINUED | OUTPATIENT
Start: 2017-07-25 | End: 2017-07-26 | Stop reason: HOSPADM

## 2017-07-25 RX ORDER — FLUMAZENIL 0.1 MG/ML
0.2 INJECTION INTRAVENOUS AS NEEDED
Status: DISCONTINUED | OUTPATIENT
Start: 2017-07-25 | End: 2017-07-25

## 2017-07-25 RX ORDER — CARVEDILOL 6.25 MG/1
6.25 TABLET ORAL 2 TIMES DAILY WITH MEALS
Status: DISCONTINUED | OUTPATIENT
Start: 2017-07-25 | End: 2017-07-26 | Stop reason: HOSPADM

## 2017-07-25 RX ORDER — FLUTICASONE PROPIONATE 50 MCG
2 SPRAY, SUSPENSION (ML) NASAL DAILY PRN
Status: DISCONTINUED | OUTPATIENT
Start: 2017-07-25 | End: 2017-07-26 | Stop reason: HOSPADM

## 2017-07-25 RX ORDER — SODIUM CHLORIDE, SODIUM GLUCONATE, SODIUM ACETATE, POTASSIUM CHLORIDE, AND MAGNESIUM CHLORIDE 526; 502; 368; 37; 30 MG/100ML; MG/100ML; MG/100ML; MG/100ML; MG/100ML
INJECTION, SOLUTION INTRAVENOUS CONTINUOUS PRN
Status: DISCONTINUED | OUTPATIENT
Start: 2017-07-25 | End: 2017-07-25 | Stop reason: SURG

## 2017-07-25 RX ORDER — BUPROPION HYDROCHLORIDE 150 MG/1
300 TABLET ORAL DAILY
Status: DISCONTINUED | OUTPATIENT
Start: 2017-07-26 | End: 2017-07-26 | Stop reason: HOSPADM

## 2017-07-25 RX ORDER — EPHEDRINE SULFATE 50 MG/ML
5 INJECTION, SOLUTION INTRAVENOUS ONCE AS NEEDED
Status: DISCONTINUED | OUTPATIENT
Start: 2017-07-25 | End: 2017-07-25

## 2017-07-25 RX ORDER — ACETAMINOPHEN 325 MG/1
650 TABLET ORAL ONCE AS NEEDED
Status: DISCONTINUED | OUTPATIENT
Start: 2017-07-25 | End: 2017-07-25

## 2017-07-25 RX ORDER — FENTANYL CITRATE 50 UG/ML
INJECTION, SOLUTION INTRAMUSCULAR; INTRAVENOUS AS NEEDED
Status: DISCONTINUED | OUTPATIENT
Start: 2017-07-25 | End: 2017-07-25 | Stop reason: SURG

## 2017-07-25 RX ORDER — BACTERIOSTATIC SODIUM CHLORIDE 0.9 %
VIAL (ML) INJECTION AS NEEDED
Status: DISCONTINUED | OUTPATIENT
Start: 2017-07-25 | End: 2017-07-26 | Stop reason: HOSPADM

## 2017-07-25 RX ORDER — BUPIVACAINE HYDROCHLORIDE AND EPINEPHRINE 5; 5 MG/ML; UG/ML
INJECTION, SOLUTION EPIDURAL; INTRACAUDAL; PERINEURAL AS NEEDED
Status: DISCONTINUED | OUTPATIENT
Start: 2017-07-25 | End: 2017-07-26 | Stop reason: HOSPADM

## 2017-07-25 RX ORDER — ONDANSETRON 2 MG/ML
4 INJECTION INTRAMUSCULAR; INTRAVENOUS ONCE AS NEEDED
Status: COMPLETED | OUTPATIENT
Start: 2017-07-25 | End: 2017-07-25

## 2017-07-25 RX ORDER — PROPOFOL 10 MG/ML
VIAL (ML) INTRAVENOUS AS NEEDED
Status: DISCONTINUED | OUTPATIENT
Start: 2017-07-25 | End: 2017-07-25 | Stop reason: SURG

## 2017-07-25 RX ORDER — ROCURONIUM BROMIDE 10 MG/ML
INJECTION, SOLUTION INTRAVENOUS AS NEEDED
Status: DISCONTINUED | OUTPATIENT
Start: 2017-07-25 | End: 2017-07-25 | Stop reason: SURG

## 2017-07-25 RX ORDER — DICYCLOMINE HCL 20 MG
20 TABLET ORAL EVERY 6 HOURS
Status: DISCONTINUED | OUTPATIENT
Start: 2017-07-25 | End: 2017-07-26 | Stop reason: HOSPADM

## 2017-07-25 RX ORDER — SERTRALINE HYDROCHLORIDE 100 MG/1
200 TABLET, FILM COATED ORAL DAILY
COMMUNITY
End: 2018-09-10 | Stop reason: SDUPTHER

## 2017-07-25 RX ORDER — MIDAZOLAM HYDROCHLORIDE 1 MG/ML
INJECTION INTRAMUSCULAR; INTRAVENOUS AS NEEDED
Status: DISCONTINUED | OUTPATIENT
Start: 2017-07-25 | End: 2017-07-25 | Stop reason: SURG

## 2017-07-25 RX ORDER — ALBUTEROL SULFATE 2.5 MG/3ML
2.5 SOLUTION RESPIRATORY (INHALATION) EVERY 4 HOURS PRN
Status: DISCONTINUED | OUTPATIENT
Start: 2017-07-25 | End: 2017-07-25 | Stop reason: SDUPTHER

## 2017-07-25 RX ORDER — SODIUM CHLORIDE, SODIUM LACTATE, POTASSIUM CHLORIDE, CALCIUM CHLORIDE 600; 310; 30; 20 MG/100ML; MG/100ML; MG/100ML; MG/100ML
100 INJECTION, SOLUTION INTRAVENOUS CONTINUOUS
Status: DISCONTINUED | OUTPATIENT
Start: 2017-07-25 | End: 2017-07-26 | Stop reason: HOSPADM

## 2017-07-25 RX ORDER — SODIUM CHLORIDE, SODIUM LACTATE, POTASSIUM CHLORIDE, CALCIUM CHLORIDE 600; 310; 30; 20 MG/100ML; MG/100ML; MG/100ML; MG/100ML
100 INJECTION, SOLUTION INTRAVENOUS CONTINUOUS
Status: DISCONTINUED | OUTPATIENT
Start: 2017-07-25 | End: 2017-07-25 | Stop reason: SDUPTHER

## 2017-07-25 RX ORDER — DIPHENHYDRAMINE HYDROCHLORIDE 50 MG/ML
12.5 INJECTION INTRAMUSCULAR; INTRAVENOUS
Status: DISCONTINUED | OUTPATIENT
Start: 2017-07-25 | End: 2017-07-25

## 2017-07-25 RX ORDER — LIDOCAINE HYDROCHLORIDE 20 MG/ML
INJECTION, SOLUTION INFILTRATION; PERINEURAL AS NEEDED
Status: DISCONTINUED | OUTPATIENT
Start: 2017-07-25 | End: 2017-07-25 | Stop reason: SURG

## 2017-07-25 RX ORDER — CLONAZEPAM 0.5 MG/1
2 TABLET ORAL 2 TIMES DAILY PRN
Status: DISCONTINUED | OUTPATIENT
Start: 2017-07-25 | End: 2017-07-26 | Stop reason: HOSPADM

## 2017-07-25 RX ORDER — LABETALOL HYDROCHLORIDE 5 MG/ML
5 INJECTION, SOLUTION INTRAVENOUS
Status: DISCONTINUED | OUTPATIENT
Start: 2017-07-25 | End: 2017-07-25

## 2017-07-25 RX ORDER — ONDANSETRON 2 MG/ML
4 INJECTION INTRAMUSCULAR; INTRAVENOUS EVERY 6 HOURS PRN
Status: DISCONTINUED | OUTPATIENT
Start: 2017-07-25 | End: 2017-07-26 | Stop reason: HOSPADM

## 2017-07-25 RX ORDER — PANTOPRAZOLE SODIUM 40 MG/1
40 TABLET, DELAYED RELEASE ORAL EVERY MORNING
Status: DISCONTINUED | OUTPATIENT
Start: 2017-07-26 | End: 2017-07-26 | Stop reason: HOSPADM

## 2017-07-25 RX ORDER — SUCCINYLCHOLINE CHLORIDE 20 MG/ML
INJECTION INTRAMUSCULAR; INTRAVENOUS AS NEEDED
Status: DISCONTINUED | OUTPATIENT
Start: 2017-07-25 | End: 2017-07-25 | Stop reason: SURG

## 2017-07-25 RX ORDER — NALOXONE HCL 0.4 MG/ML
0.1 VIAL (ML) INJECTION
Status: DISCONTINUED | OUTPATIENT
Start: 2017-07-25 | End: 2017-07-26 | Stop reason: HOSPADM

## 2017-07-25 RX ORDER — SODIUM CHLORIDE 0.9 % (FLUSH) 0.9 %
1-10 SYRINGE (ML) INJECTION AS NEEDED
Status: DISCONTINUED | OUTPATIENT
Start: 2017-07-25 | End: 2017-07-25 | Stop reason: HOSPADM

## 2017-07-25 RX ADMIN — LIDOCAINE HYDROCHLORIDE 80 MG: 20 INJECTION, SOLUTION INFILTRATION; PERINEURAL at 17:04

## 2017-07-25 RX ADMIN — DICYCLOMINE HYDROCHLORIDE 20 MG: 20 TABLET ORAL at 21:25

## 2017-07-25 RX ADMIN — FENTANYL CITRATE 50 MCG: 50 INJECTION, SOLUTION INTRAMUSCULAR; INTRAVENOUS at 18:40

## 2017-07-25 RX ADMIN — CLONAZEPAM 2 MG: 0.5 TABLET ORAL at 21:25

## 2017-07-25 RX ADMIN — ROCURONIUM BROMIDE 25 MG: 10 INJECTION INTRAVENOUS at 17:12

## 2017-07-25 RX ADMIN — ROCURONIUM BROMIDE 5 MG: 10 INJECTION INTRAVENOUS at 17:04

## 2017-07-25 RX ADMIN — CARVEDILOL 6.25 MG: 6.25 TABLET, FILM COATED ORAL at 21:25

## 2017-07-25 RX ADMIN — HYDROMORPHONE HYDROCHLORIDE 0.5 MG: 1 INJECTION, SOLUTION INTRAMUSCULAR; INTRAVENOUS; SUBCUTANEOUS at 19:58

## 2017-07-25 RX ADMIN — FENTANYL CITRATE 50 MCG: 50 INJECTION, SOLUTION INTRAMUSCULAR; INTRAVENOUS at 17:37

## 2017-07-25 RX ADMIN — SODIUM CHLORIDE, POTASSIUM CHLORIDE, SODIUM LACTATE AND CALCIUM CHLORIDE 100 ML/HR: 600; 310; 30; 20 INJECTION, SOLUTION INTRAVENOUS at 21:26

## 2017-07-25 RX ADMIN — HYDROCODONE BITARTRATE AND ACETAMINOPHEN 1 TABLET: 7.5; 325 TABLET ORAL at 22:45

## 2017-07-25 RX ADMIN — ROCURONIUM BROMIDE 20 MG: 10 INJECTION INTRAVENOUS at 17:36

## 2017-07-25 RX ADMIN — ROCURONIUM BROMIDE 10 MG: 10 INJECTION INTRAVENOUS at 17:55

## 2017-07-25 RX ADMIN — SODIUM CHLORIDE, POTASSIUM CHLORIDE, SODIUM LACTATE AND CALCIUM CHLORIDE 100 ML/HR: 600; 310; 30; 20 INJECTION, SOLUTION INTRAVENOUS at 13:38

## 2017-07-25 RX ADMIN — FENTANYL CITRATE 50 MCG: 50 INJECTION, SOLUTION INTRAMUSCULAR; INTRAVENOUS at 17:04

## 2017-07-25 RX ADMIN — SODIUM CHLORIDE, SODIUM GLUCONATE, SODIUM ACETATE, POTASSIUM CHLORIDE, AND MAGNESIUM CHLORIDE: 526; 502; 368; 37; 30 INJECTION, SOLUTION INTRAVENOUS at 18:16

## 2017-07-25 RX ADMIN — LIDOCAINE HYDROCHLORIDE 20 MG: 20 INJECTION, SOLUTION INFILTRATION; PERINEURAL at 19:05

## 2017-07-25 RX ADMIN — PROPOFOL 150 MG: 10 INJECTION, EMULSION INTRAVENOUS at 17:04

## 2017-07-25 RX ADMIN — MIDAZOLAM 2 MG: 1 INJECTION INTRAMUSCULAR; INTRAVENOUS at 16:58

## 2017-07-25 RX ADMIN — ONDANSETRON 4 MG: 2 INJECTION INTRAMUSCULAR; INTRAVENOUS at 19:05

## 2017-07-25 RX ADMIN — FENTANYL CITRATE 50 MCG: 50 INJECTION, SOLUTION INTRAMUSCULAR; INTRAVENOUS at 17:55

## 2017-07-25 RX ADMIN — FENTANYL CITRATE 25 MCG: 50 INJECTION, SOLUTION INTRAMUSCULAR; INTRAVENOUS at 18:21

## 2017-07-25 RX ADMIN — FENTANYL CITRATE 50 MCG: 50 INJECTION, SOLUTION INTRAMUSCULAR; INTRAVENOUS at 18:05

## 2017-07-25 RX ADMIN — ONDANSETRON 4 MG: 2 INJECTION INTRAMUSCULAR; INTRAVENOUS at 19:38

## 2017-07-25 RX ADMIN — ROCURONIUM BROMIDE 10 MG: 10 INJECTION INTRAVENOUS at 18:35

## 2017-07-25 RX ADMIN — PROPOFOL 50 MG: 10 INJECTION, EMULSION INTRAVENOUS at 17:12

## 2017-07-25 RX ADMIN — PHENYLEPHRINE HYDROCHLORIDE 50 MCG: 10 INJECTION INTRAVENOUS at 17:26

## 2017-07-25 RX ADMIN — FENTANYL CITRATE 25 MCG: 50 INJECTION, SOLUTION INTRAMUSCULAR; INTRAVENOUS at 18:15

## 2017-07-25 RX ADMIN — GLYCOPYRROLATE 0.4 MG: 0.2 INJECTION, SOLUTION INTRAMUSCULAR; INTRAVENOUS at 19:05

## 2017-07-25 RX ADMIN — HYDROMORPHONE HYDROCHLORIDE 0.5 MG: 1 INJECTION, SOLUTION INTRAMUSCULAR; INTRAVENOUS; SUBCUTANEOUS at 19:39

## 2017-07-25 RX ADMIN — CEFAZOLIN SODIUM 2 G: 1 INJECTION, POWDER, FOR SOLUTION INTRAMUSCULAR; INTRAVENOUS at 17:08

## 2017-07-25 RX ADMIN — FENTANYL CITRATE 50 MCG: 50 INJECTION, SOLUTION INTRAMUSCULAR; INTRAVENOUS at 18:55

## 2017-07-25 RX ADMIN — NEOSTIGMINE METHYLSULFATE 3 MG: 1 INJECTION, SOLUTION INTRAMUSCULAR; INTRAVENOUS; SUBCUTANEOUS at 19:05

## 2017-07-25 RX ADMIN — FENTANYL CITRATE 50 MCG: 50 INJECTION, SOLUTION INTRAMUSCULAR; INTRAVENOUS at 17:50

## 2017-07-25 RX ADMIN — SUCCINYLCHOLINE CHLORIDE 160 MG: 20 INJECTION, SOLUTION INTRAMUSCULAR; INTRAVENOUS at 17:05

## 2017-07-25 RX ADMIN — ROCURONIUM BROMIDE 10 MG: 10 INJECTION INTRAVENOUS at 18:15

## 2017-07-25 NOTE — ANESTHESIA PROCEDURE NOTES
Airway  Urgency: elective    Airway not difficult    General Information and Staff    Patient location during procedure: OR  CRNA: VALARIE GIBBS    Indications and Patient Condition  Indications for airway management: airway protection    Preoxygenated: yes  MILS maintained throughout  Mask difficulty assessment: 2 - vent by mask + OA or adjuvant +/- NMBA    Final Airway Details  Final airway type: endotracheal airway      Successful airway: ETT  Cuffed: yes   Successful intubation technique: direct laryngoscopy  Facilitating devices/methods: intubating stylet  Endotracheal tube insertion site: oral  Blade: Nhan  Blade size: #3  ETT size: 7.0 mm  Cormack-Lehane Classification: grade I - full view of glottis  Placement verified by: chest auscultation and capnometry   Cuff volume (mL): 8  Measured from: lips  ETT to lips (cm): 22  Number of attempts at approach: 1

## 2017-07-25 NOTE — ANESTHESIA PREPROCEDURE EVALUATION
Anesthesia Evaluation     Patient summary reviewed and Nursing notes reviewed   NPO Solid Status: > 8 hours  NPO Liquid Status: > 4 hours     Airway   Mallampati: I  TM distance: >3 FB  Neck ROM: full  possible difficult intubation  Dental    (+) poor dentation    Pulmonary - normal exam    breath sounds clear to auscultation  (+) a smoker (Uses E-cig.) Former, asthma, recent URI resolved,   Cardiovascular     ECG reviewed  Patient on routine beta blocker and Beta blocker given within 24 hours of surgery  Rhythm: regular  Rate: normal    (+) hypertension well controlled, CHF (Post partum CHF;still on coreg but no SOB or peripheral edema.), murmur (Grade I/VI systolic murmur.),     ROS comment: EKG:NSR    Neuro/Psych  (+) psychiatric history Anxiety and Depression,    GI/Hepatic/Renal/Endo    (+) obesity,  GERD well controlled,     Musculoskeletal (-) negative ROS    Abdominal   (+) obese,    Substance History - negative use     OB/GYN negative ob/gyn ROS         Other - negative ROS                                       Anesthesia Plan    ASA 3     general     Anesthetic plan and risks discussed with patient and mother.

## 2017-07-26 VITALS
DIASTOLIC BLOOD PRESSURE: 76 MMHG | RESPIRATION RATE: 18 BRPM | SYSTOLIC BLOOD PRESSURE: 116 MMHG | TEMPERATURE: 96.9 F | WEIGHT: 211.64 LBS | HEART RATE: 93 BPM | BODY MASS INDEX: 36.13 KG/M2 | OXYGEN SATURATION: 97 % | HEIGHT: 64 IN

## 2017-07-26 PROBLEM — K80.20 GALLSTONES: Status: RESOLVED | Noted: 2017-07-21 | Resolved: 2017-07-26

## 2017-07-26 LAB
ANION GAP SERPL CALCULATED.3IONS-SCNC: 10 MMOL/L (ref 5–15)
BASOPHILS # BLD AUTO: 0.02 10*3/MM3 (ref 0–0.2)
BASOPHILS NFR BLD AUTO: 0.3 % (ref 0–2)
BUN BLD-MCNC: 8 MG/DL (ref 7–21)
BUN/CREAT SERPL: 8.7 (ref 7–25)
CALCIUM SPEC-SCNC: 8.1 MG/DL (ref 8.4–10.2)
CHLORIDE SERPL-SCNC: 102 MMOL/L (ref 95–110)
CO2 SERPL-SCNC: 25 MMOL/L (ref 22–31)
CREAT BLD-MCNC: 0.92 MG/DL (ref 0.5–1)
DEPRECATED RDW RBC AUTO: 43.6 FL (ref 36.4–46.3)
EOSINOPHIL # BLD AUTO: 0.03 10*3/MM3 (ref 0–0.7)
EOSINOPHIL NFR BLD AUTO: 0.4 % (ref 0–7)
ERYTHROCYTE [DISTWIDTH] IN BLOOD BY AUTOMATED COUNT: 13.3 % (ref 11.5–14.5)
GFR SERPL CREATININE-BSD FRML MDRD: 67 ML/MIN/1.73 (ref 60–135)
GLUCOSE BLD-MCNC: 79 MG/DL (ref 60–100)
HCT VFR BLD AUTO: 33.5 % (ref 35–45)
HGB BLD-MCNC: 11.3 G/DL (ref 12–15.5)
IMM GRANULOCYTES # BLD: 0.02 10*3/MM3 (ref 0–0.02)
IMM GRANULOCYTES NFR BLD: 0.3 % (ref 0–0.5)
LYMPHOCYTES # BLD AUTO: 1.74 10*3/MM3 (ref 0.6–4.2)
LYMPHOCYTES NFR BLD AUTO: 23.9 % (ref 10–50)
MAGNESIUM SERPL-MCNC: 2 MG/DL (ref 1.6–2.3)
MCH RBC QN AUTO: 30.1 PG (ref 26.5–34)
MCHC RBC AUTO-ENTMCNC: 33.7 G/DL (ref 31.4–36)
MCV RBC AUTO: 89.3 FL (ref 80–98)
MONOCYTES # BLD AUTO: 0.72 10*3/MM3 (ref 0–0.9)
MONOCYTES NFR BLD AUTO: 9.9 % (ref 0–12)
NEUTROPHILS # BLD AUTO: 4.74 10*3/MM3 (ref 2–8.6)
NEUTROPHILS NFR BLD AUTO: 65.2 % (ref 37–80)
PHOSPHATE SERPL-MCNC: 3.2 MG/DL (ref 2.4–4.4)
PLATELET # BLD AUTO: 193 10*3/MM3 (ref 150–450)
PMV BLD AUTO: 9.5 FL (ref 8–12)
POTASSIUM BLD-SCNC: 4 MMOL/L (ref 3.5–5.1)
RBC # BLD AUTO: 3.75 10*6/MM3 (ref 3.77–5.16)
SODIUM BLD-SCNC: 137 MMOL/L (ref 137–145)
WBC NRBC COR # BLD: 7.27 10*3/MM3 (ref 3.2–9.8)

## 2017-07-26 PROCEDURE — 63710000001 DICYCLOMINE PER 20 MG: Performed by: SURGERY

## 2017-07-26 PROCEDURE — 85025 COMPLETE CBC W/AUTO DIFF WBC: CPT | Performed by: SURGERY

## 2017-07-26 PROCEDURE — A9270 NON-COVERED ITEM OR SERVICE: HCPCS | Performed by: SURGERY

## 2017-07-26 PROCEDURE — 84100 ASSAY OF PHOSPHORUS: CPT | Performed by: SURGERY

## 2017-07-26 PROCEDURE — 63710000001 PANTOPRAZOLE 40 MG TABLET DELAYED-RELEASE: Performed by: SURGERY

## 2017-07-26 PROCEDURE — 63710000001 HYDROCODONE-ACETAMINOPHEN 7.5-325 MG TABLET: Performed by: SURGERY

## 2017-07-26 PROCEDURE — 25010000002 ONDANSETRON PER 1 MG: Performed by: SURGERY

## 2017-07-26 PROCEDURE — 63710000001 CLONAZEPAM 0.5 MG TABLET: Performed by: SURGERY

## 2017-07-26 PROCEDURE — 83735 ASSAY OF MAGNESIUM: CPT | Performed by: SURGERY

## 2017-07-26 PROCEDURE — 63710000001 CARVEDILOL 6.25 MG TABLET: Performed by: SURGERY

## 2017-07-26 PROCEDURE — 80048 BASIC METABOLIC PNL TOTAL CA: CPT | Performed by: SURGERY

## 2017-07-26 PROCEDURE — 63710000001 SERTRALINE 50 MG TABLET: Performed by: SURGERY

## 2017-07-26 RX ORDER — HYDROCODONE BITARTRATE AND ACETAMINOPHEN 7.5; 325 MG/1; MG/1
1 TABLET ORAL EVERY 6 HOURS PRN
Qty: 20 TABLET | Refills: 0 | Status: SHIPPED | OUTPATIENT
Start: 2017-07-26 | End: 2017-11-15

## 2017-07-26 RX ORDER — SODIUM CHLORIDE 0.9 % (FLUSH) 0.9 %
SYRINGE (ML) INJECTION
Status: COMPLETED
Start: 2017-07-26 | End: 2017-07-26

## 2017-07-26 RX ADMIN — CARVEDILOL 6.25 MG: 6.25 TABLET, FILM COATED ORAL at 08:06

## 2017-07-26 RX ADMIN — Medication 3 ML: at 09:03

## 2017-07-26 RX ADMIN — ONDANSETRON 4 MG: 2 INJECTION INTRAMUSCULAR; INTRAVENOUS at 02:32

## 2017-07-26 RX ADMIN — HYDROCODONE BITARTRATE AND ACETAMINOPHEN 1 TABLET: 7.5; 325 TABLET ORAL at 08:08

## 2017-07-26 RX ADMIN — SERTRALINE HYDROCHLORIDE 200 MG: 50 TABLET ORAL at 08:07

## 2017-07-26 RX ADMIN — PANTOPRAZOLE SODIUM 40 MG: 40 TABLET, DELAYED RELEASE ORAL at 06:09

## 2017-07-26 RX ADMIN — SODIUM CHLORIDE, PRESERVATIVE FREE 3 ML: 5 INJECTION INTRAVENOUS at 09:03

## 2017-07-26 RX ADMIN — HYDROCODONE BITARTRATE AND ACETAMINOPHEN 1 TABLET: 7.5; 325 TABLET ORAL at 02:32

## 2017-07-26 RX ADMIN — CLONAZEPAM 2 MG: 0.5 TABLET ORAL at 08:09

## 2017-07-26 RX ADMIN — DICYCLOMINE HYDROCHLORIDE 20 MG: 20 TABLET ORAL at 08:16

## 2017-07-26 RX ADMIN — ONDANSETRON 4 MG: 2 INJECTION INTRAMUSCULAR; INTRAVENOUS at 09:02

## 2017-07-26 NOTE — ANESTHESIA POSTPROCEDURE EVALUATION
Patient: Chioma Lin    Procedure Summary     Date Anesthesia Start Anesthesia Stop Room / Location    07/25/17 [id number] [facility] OR 03 / [facility] OR       Procedure Diagnosis Surgeon Provider     LAPAROSCOPIC  POSSIBLE OPEN CHOLECYSTECTOMYWITH INTRAOPERATIVE CHOLANGIOGRAM (N/A Abdomen) Gallstones  (Gallstones [K80.20]) MD Humza Solitario MD          Anesthesia Type: general  Last vitals  BP        Temp        Pulse       Resp        SpO2          Post Anesthesia Care and Evaluation    Patient location during evaluation: PACU  Patient participation: complete - patient participated  Level of consciousness: awake and alert  Pain score: 0  Pain management: adequate  Airway patency: patent  Anesthetic complications: No anesthetic complications  PONV Status: none  Cardiovascular status: acceptable and stable  Respiratory status: acceptable, face mask and spontaneous ventilation  Hydration status: acceptable

## 2017-07-27 LAB
LAB AP CASE REPORT: NORMAL
Lab: NORMAL
PATH REPORT.FINAL DX SPEC: NORMAL
PATH REPORT.GROSS SPEC: NORMAL

## 2017-08-04 ENCOUNTER — OFFICE VISIT (OUTPATIENT)
Dept: SURGERY | Facility: CLINIC | Age: 42
End: 2017-08-04

## 2017-08-04 VITALS
DIASTOLIC BLOOD PRESSURE: 70 MMHG | WEIGHT: 211 LBS | SYSTOLIC BLOOD PRESSURE: 110 MMHG | HEIGHT: 64 IN | BODY MASS INDEX: 36.02 KG/M2

## 2017-08-04 DIAGNOSIS — K81.9 CHOLECYSTITIS: Primary | ICD-10-CM

## 2017-08-04 PROCEDURE — 99024 POSTOP FOLLOW-UP VISIT: CPT | Performed by: SURGERY

## 2017-08-04 NOTE — PROGRESS NOTES
CHIEF COMPLAINT:   Chief Complaint   Patient presents with   • Post-op Follow-up     Post operative laparoscopic cholecystectomy 7-25-17       History of present illness: This patient presents for a post-operative visit after undergoing a laparoscopic cholecystectomy.    Patient reports no problems. Eating well without any significant nausea. Having good bowel function. No problems with constipation or diarrhea. No urinary complaints. Denies fever. Ambulating well and slowly returning to normal activities.    PATHOLOGY:   Tissue Pathology Exam   Order: [id number]   Status:  Final result   Visible to patient:  No (Not Released) Dx:  Gallstones      11d ago     Final Diagnosis   Chronic cholecystitis, no stone   Electronically signed by Mu Armstrong MD on 7/27/2017 at 1302   Gross Description       The specimen consists of a pear-shaped gallbladder measuring 5 x 2 x 2 cm.  Its external surface is smooth and glistening and on further examination its wall is of the usual thickness.  The mucosa is smooth and velvety in appearance without   stone  obtained from the lumen.  Representative sections are submitted.             PHYSICAL EXAM:    ABD: Incisions are healing well without any erythema or signs of infection.    ASSESSMENT:    Chioma was seen today for post-op follow-up.    Diagnoses and all orders for this visit:    Cholecystitis      PLAN:    1.The patient will follow-up on a prn basis unless there are any problems.  2. May shower.   3. May return to normal activity without restrictions.    Signed by Cornell Garay MD

## 2017-08-07 RX ORDER — OMEPRAZOLE 40 MG/1
CAPSULE, DELAYED RELEASE ORAL
Qty: 30 CAPSULE | Refills: 11 | Status: SHIPPED | OUTPATIENT
Start: 2017-08-07 | End: 2018-08-28 | Stop reason: ALTCHOICE

## 2017-08-07 RX ORDER — DICYCLOMINE HCL 20 MG
TABLET ORAL
Qty: 120 TABLET | Refills: 11 | Status: SHIPPED | OUTPATIENT
Start: 2017-08-07 | End: 2019-12-04

## 2017-08-24 ENCOUNTER — TELEPHONE (OUTPATIENT)
Dept: SURGERY | Facility: CLINIC | Age: 42
End: 2017-08-24

## 2017-08-24 NOTE — TELEPHONE ENCOUNTER
HAVING STOMACH PAIN WITH NAUS   VOMITTING SOME BILE   HAS APPT FOR Monday   CAN YOU CALL IN ANY THING TO HELP THIS   BENTYL DID NOT HELP   SHE USES [facility] IN [place]   PT PH # [phone]

## 2017-09-21 ENCOUNTER — TELEPHONE (OUTPATIENT)
Dept: OBSTETRICS AND GYNECOLOGY | Facility: CLINIC | Age: 42
End: 2017-09-21

## 2017-09-21 NOTE — TELEPHONE ENCOUNTER
----- Message from Yuli Gilbert sent at 9/21/2017  3:35 PM CDT -----  Contact: [phone]  Patient is wanting to get something called in for a infection.      Attempted to call the pt back, and there was no answer.  Left a msg on her voicemail for her to call me back at her convenience.

## 2017-09-22 DIAGNOSIS — N94.89 OTHER SPECIFIED CONDITIONS ASSOCIATED WITH FEMALE GENITAL ORGANS AND MENSTRUAL CYCLE: ICD-10-CM

## 2017-09-22 DIAGNOSIS — N89.8 VAGINAL DISCHARGE: Primary | ICD-10-CM

## 2017-10-12 ENCOUNTER — TELEPHONE (OUTPATIENT)
Dept: OBSTETRICS AND GYNECOLOGY | Facility: CLINIC | Age: 42
End: 2017-10-12

## 2017-10-12 NOTE — TELEPHONE ENCOUNTER
----- Message from Anita Anthony sent at 10/12/2017 10:22 AM CDT -----  Contact: [phone]  PT WANTING SOMETHING CALLED IN FOR MENOPAUSE..  [facility]

## 2017-10-25 ENCOUNTER — FLU SHOT (OUTPATIENT)
Dept: FAMILY MEDICINE CLINIC | Facility: CLINIC | Age: 42
End: 2017-10-25

## 2017-10-25 PROCEDURE — 90686 IIV4 VACC NO PRSV 0.5 ML IM: CPT | Performed by: FAMILY MEDICINE

## 2017-10-25 PROCEDURE — G0008 ADMIN INFLUENZA VIRUS VAC: HCPCS | Performed by: FAMILY MEDICINE

## 2017-11-13 RX ORDER — FLUTICASONE PROPIONATE AND SALMETEROL XINAFOATE 115; 21 UG/1; UG/1
AEROSOL, METERED RESPIRATORY (INHALATION)
Qty: 12 G | Refills: 11 | Status: SHIPPED | OUTPATIENT
Start: 2017-11-13 | End: 2017-11-15 | Stop reason: SDUPTHER

## 2017-11-15 ENCOUNTER — OFFICE VISIT (OUTPATIENT)
Dept: FAMILY MEDICINE CLINIC | Facility: CLINIC | Age: 42
End: 2017-11-15

## 2017-11-15 VITALS
HEART RATE: 95 BPM | BODY MASS INDEX: 36.12 KG/M2 | DIASTOLIC BLOOD PRESSURE: 80 MMHG | HEIGHT: 64 IN | WEIGHT: 211.6 LBS | TEMPERATURE: 96.1 F | OXYGEN SATURATION: 98 % | SYSTOLIC BLOOD PRESSURE: 118 MMHG

## 2017-11-15 DIAGNOSIS — J10.1 INFLUENZA A: ICD-10-CM

## 2017-11-15 DIAGNOSIS — R52 BODY ACHES: Primary | ICD-10-CM

## 2017-11-15 LAB
EXPIRATION DATE: ABNORMAL
FLUAV AG NPH QL: ABNORMAL
FLUBV AG NPH QL: ABNORMAL
INTERNAL CONTROL: ABNORMAL
Lab: ABNORMAL

## 2017-11-15 PROCEDURE — 87804 INFLUENZA ASSAY W/OPTIC: CPT | Performed by: FAMILY MEDICINE

## 2017-11-15 PROCEDURE — 99214 OFFICE O/P EST MOD 30 MIN: CPT | Performed by: FAMILY MEDICINE

## 2017-11-15 RX ORDER — OSELTAMIVIR PHOSPHATE 75 MG/1
75 CAPSULE ORAL 2 TIMES DAILY
Qty: 10 CAPSULE | Refills: 0 | Status: SHIPPED | OUTPATIENT
Start: 2017-11-15 | End: 2018-08-28

## 2017-11-15 RX ORDER — ALBUTEROL SULFATE 2.5 MG/3ML
2.5 SOLUTION RESPIRATORY (INHALATION) EVERY 4 HOURS PRN
Qty: 120 VIAL | Refills: 11 | Status: SHIPPED | OUTPATIENT
Start: 2017-11-15

## 2017-11-15 RX ORDER — FLUTICASONE PROPIONATE 50 MCG
2 SPRAY, SUSPENSION (ML) NASAL DAILY PRN
Qty: 1 BOTTLE | Refills: 11 | Status: SHIPPED | OUTPATIENT
Start: 2017-11-15 | End: 2018-09-10 | Stop reason: SDUPTHER

## 2017-11-15 RX ORDER — ALBUTEROL SULFATE 90 UG/1
2 AEROSOL, METERED RESPIRATORY (INHALATION) EVERY 4 HOURS PRN
Qty: 1 INHALER | Refills: 11 | Status: SHIPPED | OUTPATIENT
Start: 2017-11-15 | End: 2018-09-10 | Stop reason: SDUPTHER

## 2017-11-15 RX ORDER — IBUPROFEN 600 MG/1
600 TABLET ORAL EVERY 8 HOURS PRN
Qty: 90 TABLET | Refills: 0 | Status: SHIPPED | OUTPATIENT
Start: 2017-11-15 | End: 2017-12-11 | Stop reason: SDUPTHER

## 2017-11-15 NOTE — PROGRESS NOTES
Darcy Lin is a 42 y.o. female.     History of Present Illness     Feeling bad 2 days.  Bad body aches.  Needs refils.   Coughing, fatigue, chills.    Review of Systems   Constitutional: Positive for chills, fatigue and fever.   HENT: Positive for congestion, ear pain and sinus pressure. Negative for ear discharge, facial swelling, hearing loss, postnasal drip, rhinorrhea, sore throat, trouble swallowing and voice change.    Eyes: Negative for discharge, redness and visual disturbance.   Respiratory: Positive for cough, chest tightness, shortness of breath and wheezing.    Cardiovascular: Negative for chest pain and palpitations.   Gastrointestinal: Positive for diarrhea and nausea. Negative for abdominal pain, blood in stool, constipation and vomiting.   Endocrine: Negative for polydipsia and polyuria.   Genitourinary: Negative for dysuria, flank pain, hematuria and urgency.   Musculoskeletal: Positive for back pain and myalgias. Negative for arthralgias and joint swelling.   Skin: Negative for rash.   Neurological: Positive for dizziness, weakness and headaches. Negative for numbness.   Hematological: Negative for adenopathy.   Psychiatric/Behavioral: Negative for confusion and sleep disturbance. The patient is not nervous/anxious.        Objective   Physical Exam   Constitutional: She is oriented to person, place, and time. She appears well-developed and well-nourished.   HENT:   Head: Normocephalic and atraumatic.   Right Ear: External ear normal.   Left Ear: External ear normal.   Nose: Nose normal.   Eyes: Conjunctivae and EOM are normal. Pupils are equal, round, and reactive to light.   Neck: Normal range of motion.   Pulmonary/Chest: Effort normal.   Musculoskeletal: Normal range of motion.   Neurological: She is alert and oriented to person, place, and time.   Psychiatric: She has a normal mood and affect. Her behavior is normal. Judgment and thought content normal.   Nursing note  and vitals reviewed.      Assessment/Plan   Chioma was seen today for chills, sinus problem, headache and cough.    Diagnoses and all orders for this visit:    Body aches  -     POCT Influenza A/B    Influenza A    Other orders  -     oseltamivir (TAMIFLU) 75 MG capsule; Take 1 capsule by mouth 2 (Two) Times a Day.  -     fluticasone (FLONASE) 50 MCG/ACT nasal spray; 2 sprays into each nostril Daily As Needed for Allergies. Administer 2 sprays in each nostril for each dose.  -     fluticasone-salmeterol (ADVAIR HFA) 115-21 MCG/ACT inhaler; Inhale 2 puffs 2 (Two) Times a Day.  -     albuterol (PROVENTIL HFA;VENTOLIN HFA) 108 (90 Base) MCG/ACT inhaler; Inhale 2 puffs Every 4 (Four) Hours As Needed for Wheezing.  -     albuterol (PROVENTIL) (2.5 MG/3ML) 0.083% nebulizer solution; Take 2.5 mg by nebulization Every 4 (Four) Hours As Needed for Wheezing or Shortness of Air.  -     ibuprofen (ADVIL,MOTRIN) 600 MG tablet; Take 1 tablet by mouth Every 8 (Eight) Hours As Needed for Mild Pain .        Wants to talk about treating her neuropathy? Leg pain.

## 2017-11-20 ENCOUNTER — TELEPHONE (OUTPATIENT)
Dept: FAMILY MEDICINE CLINIC | Facility: CLINIC | Age: 42
End: 2017-11-20

## 2017-11-20 RX ORDER — PROMETHAZINE HYDROCHLORIDE AND CODEINE PHOSPHATE 6.25; 1 MG/5ML; MG/5ML
5 SYRUP ORAL EVERY 6 HOURS PRN
Qty: 240 ML | Refills: 0 | Status: SHIPPED | OUTPATIENT
Start: 2017-11-20 | End: 2018-08-28

## 2017-11-20 NOTE — TELEPHONE ENCOUNTER
TAKES LAST TAMIFLU TODAY.    HEAD STILL HURTS REALLY BAD. CAN'T SLEEP. COUGHING. HEAD IS POUNDING.    HAS BEEN TAKING IBUPROFEN, BUT IT DOESN'T HELP HER HEADACHE.    WHAT ELSE SHOULD SHE DO?    [phone]

## 2017-12-11 RX ORDER — IBUPROFEN 600 MG/1
TABLET ORAL
Qty: 90 TABLET | Refills: 0 | Status: SHIPPED | OUTPATIENT
Start: 2017-12-11 | End: 2018-08-28 | Stop reason: ALTCHOICE

## 2018-02-08 ENCOUNTER — TELEPHONE (OUTPATIENT)
Dept: FAMILY MEDICINE CLINIC | Facility: CLINIC | Age: 43
End: 2018-02-08

## 2018-02-08 RX ORDER — MALATHION 0 G/ML
LOTION TOPICAL ONCE
Qty: 59 ML | Refills: 0 | Status: SHIPPED | OUTPATIENT
Start: 2018-02-08 | End: 2018-02-08

## 2018-02-08 NOTE — TELEPHONE ENCOUNTER
PATIENT WANTS RX FOR LICE TREATMENT, MALATHION LOTION.  SHE WAS AROUND CHILDREN WITH LICE.  [place]

## 2018-08-28 ENCOUNTER — OFFICE VISIT (OUTPATIENT)
Dept: FAMILY MEDICINE CLINIC | Facility: CLINIC | Age: 43
End: 2018-08-28

## 2018-08-28 VITALS
BODY MASS INDEX: 33.7 KG/M2 | OXYGEN SATURATION: 97 % | HEIGHT: 64 IN | SYSTOLIC BLOOD PRESSURE: 120 MMHG | TEMPERATURE: 98.1 F | DIASTOLIC BLOOD PRESSURE: 80 MMHG | WEIGHT: 197.4 LBS | HEART RATE: 101 BPM

## 2018-08-28 DIAGNOSIS — D64.9 ANEMIA, UNSPECIFIED TYPE: ICD-10-CM

## 2018-08-28 DIAGNOSIS — E55.9 VITAMIN D DEFICIENCY: ICD-10-CM

## 2018-08-28 DIAGNOSIS — Z83.3 FAMILY HISTORY OF DIABETES MELLITUS: ICD-10-CM

## 2018-08-28 DIAGNOSIS — R20.2 PARESTHESIA: Primary | ICD-10-CM

## 2018-08-28 PROCEDURE — 82607 VITAMIN B-12: CPT | Performed by: FAMILY MEDICINE

## 2018-08-28 PROCEDURE — 80048 BASIC METABOLIC PNL TOTAL CA: CPT | Performed by: FAMILY MEDICINE

## 2018-08-28 PROCEDURE — 99214 OFFICE O/P EST MOD 30 MIN: CPT | Performed by: FAMILY MEDICINE

## 2018-08-28 PROCEDURE — 84207 ASSAY OF VITAMIN B-6: CPT | Performed by: FAMILY MEDICINE

## 2018-08-28 PROCEDURE — 82746 ASSAY OF FOLIC ACID SERUM: CPT | Performed by: FAMILY MEDICINE

## 2018-08-28 PROCEDURE — 36415 COLL VENOUS BLD VENIPUNCTURE: CPT | Performed by: FAMILY MEDICINE

## 2018-08-28 PROCEDURE — 82306 VITAMIN D 25 HYDROXY: CPT | Performed by: FAMILY MEDICINE

## 2018-08-28 PROCEDURE — 82728 ASSAY OF FERRITIN: CPT | Performed by: FAMILY MEDICINE

## 2018-08-28 PROCEDURE — 84425 ASSAY OF VITAMIN B-1: CPT | Performed by: FAMILY MEDICINE

## 2018-08-28 RX ORDER — AMITRIPTYLINE HYDROCHLORIDE 10 MG/1
10-30 TABLET, FILM COATED ORAL NIGHTLY
Qty: 90 TABLET | Refills: 0 | Status: SHIPPED | OUTPATIENT
Start: 2018-08-28 | End: 2018-12-03 | Stop reason: SDUPTHER

## 2018-08-28 RX ORDER — RANITIDINE 150 MG/1
150 TABLET ORAL AS NEEDED
COMMUNITY
End: 2019-12-04

## 2018-08-28 RX ORDER — ERGOCALCIFEROL 1.25 MG/1
50000 CAPSULE ORAL
COMMUNITY
Start: 2018-07-13 | End: 2019-07-14

## 2018-08-28 NOTE — PROGRESS NOTES
" Subjective   Chioma Lin is a 42 y.o. female.     History of Present Illness     Pain in legs months, she says she was told it was a neuropathy.  She didn't take the lipitor that was recently offered, she had leg problems with this in past.  She says her vit d level was 7 and gfr in 40's.    She is worried about her kidneys and vit d.  History of chf and sees dr santillan  Has had steroid shots in past with no swelling or sob  She has asthma.   She is tearful today  She says she takes maybe one or two klonopin 2mg each week for her panic disorder  Her back hurts all the way down to sides of upper legs.  Again, cries because of this.    Review of Systems   Constitutional: Positive for chills and fatigue. Negative for fever.   HENT: Positive for congestion and sinus pressure. Negative for ear discharge, ear pain, facial swelling, hearing loss, postnasal drip, rhinorrhea, sore throat, trouble swallowing and voice change.    Eyes: Negative for discharge, redness and visual disturbance.   Respiratory: Positive for cough and shortness of breath. Negative for chest tightness and wheezing.    Cardiovascular: Negative for chest pain and palpitations.   Gastrointestinal: Negative for abdominal pain, blood in stool, constipation, diarrhea, nausea and vomiting.   Endocrine: Negative for polydipsia and polyuria.   Genitourinary: Negative for dysuria, flank pain, hematuria and urgency.   Musculoskeletal: Positive for back pain and myalgias. Negative for arthralgias and joint swelling.   Skin: Negative for rash.   Neurological: Positive for weakness. Negative for dizziness, numbness and headaches.   Hematological: Negative for adenopathy.   Psychiatric/Behavioral: Negative for confusion and sleep disturbance. The patient is nervous/anxious.            /80 (BP Location: Left arm, Patient Position: Sitting, Cuff Size: Adult)   Pulse 101   Temp 98.1 °F (36.7 °C) (Temporal Artery )   Ht 162.6 cm (64.02\")   Wt 89.5 " kg (197 lb 6.4 oz)   SpO2 97%   BMI 33.87 kg/m²       Objective     Physical Exam   Constitutional: She is oriented to person, place, and time. She appears well-developed and well-nourished.   HENT:   Head: Normocephalic and atraumatic.   Right Ear: External ear normal.   Left Ear: External ear normal.   Nose: Nose normal.   Eyes: Pupils are equal, round, and reactive to light. Conjunctivae and EOM are normal.   Neck: Normal range of motion.   Pulmonary/Chest: Effort normal.   Musculoskeletal: Normal range of motion.   Tenderness both greater trochanter bursa   Neurological: She is alert and oriented to person, place, and time.   Psychiatric: She has a normal mood and affect. Her behavior is normal. Judgment and thought content normal.   Nursing note and vitals reviewed.          PAST MEDICAL HISTORY     Past Medical History:   Diagnosis Date   • Abdominal pain    • Acid reflux    • Acute sinusitis    • Allergic rhinitis     Due to Pollen   • Asthma    • Astigmatism    • Cardiomyopathy (CMS/HCC)     post partum   • CHF (congestive heart failure) (CMS/HCC)    • Chronic pain    • Common cold    • Constipation    • Depressive disorder    • Diarrhea    • Dysfunction of eustachian tube    • Encounter for gynecological examination with abnormal finding    • Encounter for screening for infections with predominantly sexual mode of transmission    • Encounter for screening mammogram for malignant neoplasm of breast    • Excessive cerumen in ear canal    • Hypertensive disorder     hx   • Irregular menstruation    • Menopausal flushing    • Mood disorder (CMS/HCC)     Severe   • Myopia    • Nausea and vomiting    • Otalgia     Unspecified ear   • Presbyopia    • Sebaceous cyst    • Vaginal discharge    • Vaginal odor    • Venereal disease screening       PAST SURGICAL HISTORY     Past Surgical History:   Procedure Laterality Date   •  SECTION  2005   • CHOLECYSTECTOMY N/A 2017    Procedure:  LAPAROSCOPIC   POSSIBLE OPEN CHOLECYSTECTOMYWITH INTRAOPERATIVE CHOLANGIOGRAM;  Surgeon: Cornell Garay MD;  Location: [facility];  Service:    • INJECTION OF MEDICATION  04/04/2016    Phenergan   • OTHER SURGICAL HISTORY  11/25/2013    Removal of impacted cerumen   • PAP SMEAR  2013   • TUBAL ABDOMINAL LIGATION        SOCIAL HISTORY     Social History     Social History   • Marital status:      Social History Main Topics   • Smoking status: Former Smoker     Types: Electronic Cigarette, Cigarettes     Quit date: 2015   • Smokeless tobacco: Never Used      Comment: 3 cigarettes per day. Electronic Cigarette daily.   • Alcohol use No   • Drug use: No   • Sexual activity: No     Other Topics Concern   • Not on file      ALLERGIES   Adhesive tape and Paxil [paroxetine]   MEDICATIONS     Current Outpatient Prescriptions   Medication Sig Dispense Refill   • albuterol (PROVENTIL HFA;VENTOLIN HFA) 108 (90 Base) MCG/ACT inhaler Inhale 2 puffs Every 4 (Four) Hours As Needed for Wheezing. 1 inhaler 11   • albuterol (PROVENTIL) (2.5 MG/3ML) 0.083% nebulizer solution Take 2.5 mg by nebulization Every 4 (Four) Hours As Needed for Wheezing or Shortness of Air. 120 vial 11   • aspirin 81 MG tablet Take 81 mg by mouth Daily.     • buPROPion XL (WELLBUTRIN XL) 300 MG 24 hr tablet Take 300 mg by mouth Daily.     • carvedilol (COREG) 6.25 MG tablet Take 6.25 mg by mouth 2 (Two) Times a Day With Meals.     • clonazePAM (KlonoPIN) 2 MG tablet Take 2 mg by mouth 2 (Two) Times a Day As Needed.  3   • dicyclomine (BENTYL) 20 MG tablet TAKE 1 TABLET BY MOUTH EVERY 6 (SIX) HOURS. 120 tablet 11   • ergocalciferol (ERGOCALCIFEROL) 33901 units capsule Take 50,000 Units by mouth.     • fluticasone-salmeterol (ADVAIR HFA) 115-21 MCG/ACT inhaler Inhale 2 puffs 2 (Two) Times a Day. 12 g 11   • QUEtiapine XR (SEROquel XR) 400 MG 24 hr tablet Take 400 mg by mouth every night.     • raNITIdine (ZANTAC) 150 MG tablet Take 150 mg by mouth As Needed for Heartburn.      • sertraline (ZOLOFT) 100 MG tablet Take 200 mg by mouth Daily.     • valACYclovir (VALTREX) 1000 MG tablet Take 500 mg by mouth 2 (Two) Times a Day As Needed.     • amitriptyline (ELAVIL) 10 MG tablet Take 1-3 tablets by mouth Every Night. neuropathy 90 tablet 0   • fluticasone (FLONASE) 50 MCG/ACT nasal spray 2 sprays into each nostril Daily As Needed for Allergies. Administer 2 sprays in each nostril for each dose. 1 bottle 11   • temazepam (RESTORIL) 15 MG capsule Take 15 mg by mouth At Night As Needed.  3     No current facility-administered medications for this visit.         The following portions of the patient's history were reviewed and updated as appropriate: allergies, current medications, past family history, past medical history, past social history, past surgical history and problem list.        Assessment/Plan   Chioma was seen today for pain.    Diagnoses and all orders for this visit:    Paresthesia  -     Folate  -     Vitamin B1, Whole Blood  -     Vitamin B12  -     Vitamin B6  -     Cancel: TSH    Anemia, unspecified type  -     Ferritin    Vitamin D deficiency  -     Vitamin D 25 Hydroxy    Family history of diabetes mellitus  -     Hemoglobin A1c    Other orders  -     amitriptyline (ELAVIL) 10 MG tablet; Take 1-3 tablets by mouth Every Night. neuropathy      Added elavil  Will call with results.   Reassurance    PROCEDURE NOTE:  CONSENT SIGNED:  EACH SIDE DONE ONE AT A TIME.  GREATER TROCHANTER BURSA AREAS TREATED WITH BETADINE AND ALLOWED TO DRY.  STERILE TECHNIQUE,  10MG KENALOG AND 1.75ML 2% LIDOCAINE INJECTED FOUR TIMES, TWO EACH SIDE AT AREAS MOST TENDER TO PALPATION.   TOLERATED WELL    KENALOG NDC:  3419-4322-96                 No Follow-up on file.                  This document has been electronically signed by Benigno Hitchcock MD on August 28, 2018 1:39 PM

## 2018-08-28 NOTE — PATIENT INSTRUCTIONS

## 2018-08-29 LAB
25(OH)D3 SERPL-MCNC: 21.1 NG/ML (ref 30–100)
FERRITIN SERPL-MCNC: 37.5 NG/ML (ref 6.2–137)
FOLATE SERPL-MCNC: 2.8 NG/ML (ref 2.76–21)
VIT B12 BLD-MCNC: 169 PG/ML (ref 239–931)

## 2018-08-30 ENCOUNTER — CLINICAL SUPPORT (OUTPATIENT)
Dept: FAMILY MEDICINE CLINIC | Facility: CLINIC | Age: 43
End: 2018-08-30

## 2018-08-30 ENCOUNTER — TELEPHONE (OUTPATIENT)
Dept: FAMILY MEDICINE CLINIC | Facility: CLINIC | Age: 43
End: 2018-08-30

## 2018-08-30 DIAGNOSIS — E66.9 OBESITY, UNSPECIFIED CLASSIFICATION, UNSPECIFIED OBESITY TYPE, UNSPECIFIED WHETHER SERIOUS COMORBIDITY PRESENT: Primary | ICD-10-CM

## 2018-08-30 DIAGNOSIS — R94.4 DECREASED GFR: Primary | ICD-10-CM

## 2018-08-30 DIAGNOSIS — D51.9 ANEMIA DUE TO VITAMIN B12 DEFICIENCY, UNSPECIFIED B12 DEFICIENCY TYPE: Primary | ICD-10-CM

## 2018-08-30 LAB
ANION GAP SERPL CALCULATED.3IONS-SCNC: 11 MMOL/L (ref 5–15)
BUN BLD-MCNC: 7 MG/DL (ref 7–21)
BUN/CREAT SERPL: 7.5 (ref 7–25)
CALCIUM SPEC-SCNC: 9.3 MG/DL (ref 8.4–10.2)
CHLORIDE SERPL-SCNC: 107 MMOL/L (ref 95–110)
CO2 SERPL-SCNC: 24 MMOL/L (ref 22–31)
CREAT BLD-MCNC: 0.93 MG/DL (ref 0.5–1)
GFR SERPL CREATININE-BSD FRML MDRD: 65 ML/MIN/1.73 (ref 58–135)
GLUCOSE BLD-MCNC: 91 MG/DL (ref 60–100)
POTASSIUM BLD-SCNC: 4.1 MMOL/L (ref 3.5–5.1)
SODIUM BLD-SCNC: 142 MMOL/L (ref 137–145)

## 2018-08-30 PROCEDURE — 36415 COLL VENOUS BLD VENIPUNCTURE: CPT | Performed by: FAMILY MEDICINE

## 2018-08-30 PROCEDURE — 83036 HEMOGLOBIN GLYCOSYLATED A1C: CPT | Performed by: FAMILY MEDICINE

## 2018-08-30 PROCEDURE — 96372 THER/PROPH/DIAG INJ SC/IM: CPT | Performed by: FAMILY MEDICINE

## 2018-08-30 RX ORDER — CYANOCOBALAMIN 1000 UG/ML
1000 INJECTION, SOLUTION INTRAMUSCULAR; SUBCUTANEOUS
Status: SHIPPED | OUTPATIENT
Start: 2018-08-30

## 2018-08-30 RX ADMIN — CYANOCOBALAMIN 1000 MCG: 1000 INJECTION, SOLUTION INTRAMUSCULAR; SUBCUTANEOUS at 15:49

## 2018-09-04 LAB — VIT B6 SERPL-MCNC: 2.7 UG/L (ref 2–32.8)

## 2018-09-10 ENCOUNTER — OFFICE VISIT (OUTPATIENT)
Dept: FAMILY MEDICINE CLINIC | Facility: CLINIC | Age: 43
End: 2018-09-10

## 2018-09-10 VITALS
HEART RATE: 94 BPM | BODY MASS INDEX: 33.57 KG/M2 | SYSTOLIC BLOOD PRESSURE: 112 MMHG | HEIGHT: 64 IN | TEMPERATURE: 97.1 F | DIASTOLIC BLOOD PRESSURE: 85 MMHG | OXYGEN SATURATION: 98 % | WEIGHT: 196.6 LBS

## 2018-09-10 DIAGNOSIS — D51.9 ANEMIA DUE TO VITAMIN B12 DEFICIENCY, UNSPECIFIED B12 DEFICIENCY TYPE: ICD-10-CM

## 2018-09-10 DIAGNOSIS — J30.2 ACUTE SEASONAL ALLERGIC RHINITIS, UNSPECIFIED TRIGGER: Primary | ICD-10-CM

## 2018-09-10 DIAGNOSIS — F41.9 ANXIETY: ICD-10-CM

## 2018-09-10 PROCEDURE — 99213 OFFICE O/P EST LOW 20 MIN: CPT | Performed by: FAMILY MEDICINE

## 2018-09-10 RX ORDER — ALBUTEROL SULFATE 90 UG/1
2 AEROSOL, METERED RESPIRATORY (INHALATION) EVERY 4 HOURS PRN
Qty: 1 INHALER | Refills: 11 | Status: SHIPPED | OUTPATIENT
Start: 2018-09-10 | End: 2019-12-23

## 2018-09-10 RX ORDER — CYANOCOBALAMIN 1000 UG/ML
1000 INJECTION, SOLUTION INTRAMUSCULAR; SUBCUTANEOUS ONCE
Status: DISCONTINUED | OUTPATIENT
Start: 2018-09-10 | End: 2018-09-10

## 2018-09-10 RX ORDER — FLUTICASONE PROPIONATE 50 MCG
2 SPRAY, SUSPENSION (ML) NASAL DAILY PRN
Qty: 1 BOTTLE | Refills: 11 | Status: SHIPPED | OUTPATIENT
Start: 2018-09-10

## 2018-09-10 RX ORDER — SERTRALINE HYDROCHLORIDE 100 MG/1
200 TABLET, FILM COATED ORAL DAILY
Qty: 60 TABLET | Refills: 11 | Status: SHIPPED | OUTPATIENT
Start: 2018-09-10 | End: 2019-10-09 | Stop reason: SDUPTHER

## 2018-09-10 NOTE — PROGRESS NOTES
" Subjective   Chioma Lin is a 42 y.o. female.     History of Present Illness     Here for b12 shot.  She is very emotional.  Says she doesn't feel well. Always worried about her chr.  Dr santillan never tells her results?    Review of Systems   Constitutional: Positive for fatigue. Negative for chills and fever.   HENT: Positive for congestion and sinus pressure. Negative for ear discharge, ear pain, facial swelling, hearing loss, postnasal drip, rhinorrhea, sore throat, trouble swallowing and voice change.    Eyes: Negative for discharge, redness and visual disturbance.   Respiratory: Positive for cough, chest tightness and shortness of breath. Negative for wheezing.    Cardiovascular: Negative for chest pain and palpitations.   Gastrointestinal: Positive for abdominal pain and nausea. Negative for blood in stool, constipation, diarrhea and vomiting.   Endocrine: Negative for polydipsia and polyuria.   Genitourinary: Negative for dysuria, flank pain, hematuria and urgency.   Musculoskeletal: Positive for back pain and myalgias. Negative for arthralgias and joint swelling.   Skin: Negative for rash.   Neurological: Positive for weakness and headaches. Negative for dizziness and numbness.   Hematological: Negative for adenopathy.   Psychiatric/Behavioral: Negative for confusion and sleep disturbance. The patient is nervous/anxious.            /85 (BP Location: Left arm, Patient Position: Sitting, Cuff Size: Adult)   Pulse 94   Temp 97.1 °F (36.2 °C) (Temporal Artery )   Ht 162.6 cm (64.02\")   Wt 89.2 kg (196 lb 9.6 oz)   SpO2 98%   BMI 33.73 kg/m²       Objective     Physical Exam   Constitutional: She is oriented to person, place, and time. She appears well-developed and well-nourished.   HENT:   Head: Normocephalic and atraumatic.   Right Ear: External ear normal.   Left Ear: External ear normal.   Nose: Nose normal.   Mouth/Throat: Oropharynx is clear and moist.   Eyes: Pupils are equal, " round, and reactive to light. Conjunctivae and EOM are normal.   Neck: Normal range of motion. Neck supple.   Cardiovascular: Normal rate, regular rhythm and normal heart sounds.  Exam reveals no gallop and no friction rub.    No murmur heard.  Pulmonary/Chest: Effort normal and breath sounds normal.   Abdominal: Soft. Bowel sounds are normal. She exhibits no distension. There is no tenderness. There is no rebound and no guarding.   Musculoskeletal: Normal range of motion. She exhibits no edema or deformity.   Neurological: She is alert and oriented to person, place, and time. No cranial nerve deficit.   Skin: Skin is warm and dry. No rash noted. No erythema.   Psychiatric: Her behavior is normal. Judgment and thought content normal. Her mood appears anxious.   Always tearful when coming in   Nursing note and vitals reviewed.          PAST MEDICAL HISTORY     Past Medical History:   Diagnosis Date   • Abdominal pain    • Acid reflux    • Acute sinusitis    • Allergic rhinitis     Due to Pollen   • Asthma    • Astigmatism    • Cardiomyopathy (CMS/HCC)     post partum   • CHF (congestive heart failure) (CMS/HCC)    • Chronic pain    • Common cold    • Constipation    • Depressive disorder    • Diarrhea    • Dysfunction of eustachian tube    • Encounter for gynecological examination with abnormal finding    • Encounter for screening for infections with predominantly sexual mode of transmission    • Encounter for screening mammogram for malignant neoplasm of breast    • Excessive cerumen in ear canal    • Hypertensive disorder     hx   • Irregular menstruation    • Menopausal flushing    • Mood disorder (CMS/HCC)     Severe   • Myopia    • Nausea and vomiting    • Otalgia     Unspecified ear   • Presbyopia    • Sebaceous cyst    • Vaginal discharge    • Vaginal odor    • Venereal disease screening       PAST SURGICAL HISTORY     Past Surgical History:   Procedure Laterality Date   •  SECTION  2005   •  CHOLECYSTECTOMY N/A 7/25/2017    Procedure:  LAPAROSCOPIC  POSSIBLE OPEN CHOLECYSTECTOMYWITH INTRAOPERATIVE CHOLANGIOGRAM;  Surgeon: Cornell Garay MD;  Location: [facility];  Service:    • INJECTION OF MEDICATION  04/04/2016    Phenergan   • OTHER SURGICAL HISTORY  11/25/2013    Removal of impacted cerumen   • PAP SMEAR  2013   • TUBAL ABDOMINAL LIGATION        SOCIAL HISTORY     Social History     Social History   • Marital status:      Social History Main Topics   • Smoking status: Former Smoker     Types: Electronic Cigarette, Cigarettes     Quit date: 2015   • Smokeless tobacco: Never Used      Comment: 3 cigarettes per day. Electronic Cigarette daily.   • Alcohol use No   • Drug use: No   • Sexual activity: No     Other Topics Concern   • Not on file      ALLERGIES   Adhesive tape and Paxil [paroxetine]   MEDICATIONS     Current Outpatient Prescriptions   Medication Sig Dispense Refill   • albuterol (PROVENTIL HFA;VENTOLIN HFA) 108 (90 Base) MCG/ACT inhaler Inhale 2 puffs Every 4 (Four) Hours As Needed for Wheezing. 1 inhaler 11   • albuterol (PROVENTIL) (2.5 MG/3ML) 0.083% nebulizer solution Take 2.5 mg by nebulization Every 4 (Four) Hours As Needed for Wheezing or Shortness of Air. 120 vial 11   • amitriptyline (ELAVIL) 10 MG tablet Take 1-3 tablets by mouth Every Night. neuropathy 90 tablet 0   • aspirin 81 MG tablet Take 81 mg by mouth Daily.     • buPROPion XL (WELLBUTRIN XL) 300 MG 24 hr tablet Take 300 mg by mouth Daily.     • carvedilol (COREG) 6.25 MG tablet Take 6.25 mg by mouth 2 (Two) Times a Day With Meals.     • clonazePAM (KlonoPIN) 2 MG tablet Take 2 mg by mouth 2 (Two) Times a Day As Needed.  3   • dicyclomine (BENTYL) 20 MG tablet TAKE 1 TABLET BY MOUTH EVERY 6 (SIX) HOURS. 120 tablet 11   • ergocalciferol (ERGOCALCIFEROL) 76533 units capsule Take 50,000 Units by mouth.     • fluticasone (FLONASE) 50 MCG/ACT nasal spray 2 sprays into the nostril(s) as directed by provider Daily As Needed  for Allergies. Administer 2 sprays in each nostril for each dose. 1 bottle 11   • fluticasone-salmeterol (ADVAIR HFA) 115-21 MCG/ACT inhaler Inhale 2 puffs 2 (Two) Times a Day. 12 g 11   • QUEtiapine XR (SEROquel XR) 400 MG 24 hr tablet Take 400 mg by mouth every night.     • raNITIdine (ZANTAC) 150 MG tablet Take 150 mg by mouth As Needed for Heartburn.     • sertraline (ZOLOFT) 100 MG tablet Take 2 tablets by mouth Daily. 60 tablet 11   • temazepam (RESTORIL) 15 MG capsule Take 15 mg by mouth At Night As Needed.  3   • valACYclovir (VALTREX) 1000 MG tablet Take 500 mg by mouth 2 (Two) Times a Day As Needed.       Current Facility-Administered Medications   Medication Dose Route Frequency Provider Last Rate Last Dose   • cyanocobalamin injection 1,000 mcg  1,000 mcg Intramuscular Q28 Days Benigno Hitchcock MD   1,000 mcg at 08/30/18 9906        The following portions of the patient's history were reviewed and updated as appropriate: allergies, current medications, past family history, past medical history, past social history, past surgical history and problem list.        Assessment/Plan   Chioma was seen today for illness, cough and uri.    Diagnoses and all orders for this visit:    Acute seasonal allergic rhinitis, unspecified trigger    Anxiety    Anemia due to vitamin B12 deficiency, unspecified B12 deficiency type  -     Discontinue: cyanocobalamin injection 1,000 mcg; Inject 1 mL into the appropriate muscle as directed by prescriber 1 (One) Time.    Other orders  -     fluticasone (FLONASE) 50 MCG/ACT nasal spray; 2 sprays into the nostril(s) as directed by provider Daily As Needed for Allergies. Administer 2 sprays in each nostril for each dose.        Explained she needs counseling.  She says never helps.  Suggested she get off benzo's, she says no klnonopin 1.5 weeks and no restoril?  Reassurance given, heart is fine.                No Follow-up on file.                  This document has been  electronically signed by Benigno Hitchcock MD on September 10, 2018 12:49 PM

## 2018-09-17 RX ORDER — BUPROPION HYDROCHLORIDE 300 MG/1
300 TABLET ORAL DAILY
Qty: 30 TABLET | Refills: 11 | Status: SHIPPED | OUTPATIENT
Start: 2018-09-17

## 2018-09-18 ENCOUNTER — CLINICAL SUPPORT (OUTPATIENT)
Dept: FAMILY MEDICINE CLINIC | Facility: CLINIC | Age: 43
End: 2018-09-18

## 2018-09-18 DIAGNOSIS — D51.9 ANEMIA DUE TO VITAMIN B12 DEFICIENCY, UNSPECIFIED B12 DEFICIENCY TYPE: Primary | ICD-10-CM

## 2018-09-18 PROCEDURE — 96372 THER/PROPH/DIAG INJ SC/IM: CPT | Performed by: FAMILY MEDICINE

## 2018-09-18 RX ADMIN — CYANOCOBALAMIN 1000 MCG: 1000 INJECTION, SOLUTION INTRAMUSCULAR; SUBCUTANEOUS at 16:04

## 2018-09-25 ENCOUNTER — CLINICAL SUPPORT (OUTPATIENT)
Dept: FAMILY MEDICINE CLINIC | Facility: CLINIC | Age: 43
End: 2018-09-25

## 2018-09-25 DIAGNOSIS — D51.9 ANEMIA DUE TO VITAMIN B12 DEFICIENCY, UNSPECIFIED B12 DEFICIENCY TYPE: Primary | ICD-10-CM

## 2018-09-25 PROCEDURE — 96372 THER/PROPH/DIAG INJ SC/IM: CPT | Performed by: FAMILY MEDICINE

## 2018-09-25 RX ADMIN — CYANOCOBALAMIN 1000 MCG: 1000 INJECTION, SOLUTION INTRAMUSCULAR; SUBCUTANEOUS at 16:49

## 2018-11-12 ENCOUNTER — TELEPHONE (OUTPATIENT)
Dept: FAMILY MEDICINE CLINIC | Facility: CLINIC | Age: 43
End: 2018-11-12

## 2018-11-12 ENCOUNTER — CLINICAL SUPPORT (OUTPATIENT)
Dept: FAMILY MEDICINE CLINIC | Facility: CLINIC | Age: 43
End: 2018-11-12

## 2018-11-12 DIAGNOSIS — D51.9 ANEMIA DUE TO VITAMIN B12 DEFICIENCY, UNSPECIFIED B12 DEFICIENCY TYPE: ICD-10-CM

## 2018-11-12 DIAGNOSIS — R05.9 COUGH: Primary | ICD-10-CM

## 2018-11-12 PROCEDURE — 96372 THER/PROPH/DIAG INJ SC/IM: CPT | Performed by: FAMILY MEDICINE

## 2018-11-12 RX ORDER — TRIAMCINOLONE ACETONIDE 40 MG/ML
40 INJECTION, SUSPENSION INTRA-ARTICULAR; INTRAMUSCULAR ONCE
Status: COMPLETED | OUTPATIENT
Start: 2018-11-12 | End: 2018-11-12

## 2018-11-12 RX ADMIN — TRIAMCINOLONE ACETONIDE 40 MG: 40 INJECTION, SUSPENSION INTRA-ARTICULAR; INTRAMUSCULAR at 15:27

## 2018-11-12 RX ADMIN — CYANOCOBALAMIN 1000 MCG: 1000 INJECTION, SOLUTION INTRAMUSCULAR; SUBCUTANEOUS at 15:28

## 2018-11-19 ENCOUNTER — OFFICE VISIT (OUTPATIENT)
Dept: FAMILY MEDICINE CLINIC | Facility: CLINIC | Age: 43
End: 2018-11-19

## 2018-11-19 VITALS
HEIGHT: 64 IN | SYSTOLIC BLOOD PRESSURE: 104 MMHG | BODY MASS INDEX: 33.94 KG/M2 | OXYGEN SATURATION: 97 % | DIASTOLIC BLOOD PRESSURE: 70 MMHG | HEART RATE: 86 BPM | TEMPERATURE: 97.1 F | WEIGHT: 198.8 LBS

## 2018-11-19 DIAGNOSIS — J06.9 ACUTE URI: ICD-10-CM

## 2018-11-19 DIAGNOSIS — R52 BODY ACHES: Primary | ICD-10-CM

## 2018-11-19 LAB
EXPIRATION DATE: NORMAL
FLUAV AG NPH QL: NORMAL
FLUBV AG NPH QL: NORMAL
INTERNAL CONTROL: NORMAL
Lab: NORMAL

## 2018-11-19 PROCEDURE — 99214 OFFICE O/P EST MOD 30 MIN: CPT | Performed by: FAMILY MEDICINE

## 2018-11-19 PROCEDURE — 87804 INFLUENZA ASSAY W/OPTIC: CPT | Performed by: FAMILY MEDICINE

## 2018-11-19 RX ORDER — GUAIFENESIN/DEXTROMETHORPHAN 100-10MG/5
10 SYRUP ORAL 4 TIMES DAILY PRN
Qty: 240 ML | Refills: 1 | Status: SHIPPED | OUTPATIENT
Start: 2018-11-19 | End: 2019-12-04

## 2018-11-19 RX ORDER — LORATADINE 10 MG/1
10 TABLET ORAL DAILY
Qty: 30 TABLET | Refills: 11 | Status: SHIPPED | OUTPATIENT
Start: 2018-11-19

## 2018-11-19 RX ORDER — METHYLPREDNISOLONE 4 MG/1
TABLET ORAL
Qty: 21 TABLET | Refills: 0 | Status: SHIPPED | OUTPATIENT
Start: 2018-11-19 | End: 2019-12-04

## 2018-11-19 NOTE — PROGRESS NOTES
" Subjective   Chioma Lin is a 43 y.o. female.     History of Present Illness     She believes she has the flu.  2 days, chills, chest hurts, fever, weak, body weat and hurts.     Review of Systems   Constitutional: Positive for chills, fatigue and fever.   HENT: Negative for congestion, ear discharge, ear pain, facial swelling, hearing loss, postnasal drip, rhinorrhea, sinus pressure, sore throat, trouble swallowing and voice change.    Eyes: Negative for discharge, redness and visual disturbance.   Respiratory: Negative for cough, chest tightness, shortness of breath and wheezing.    Cardiovascular: Negative for chest pain and palpitations.   Gastrointestinal: Negative for abdominal pain, blood in stool, constipation, diarrhea, nausea and vomiting.   Endocrine: Negative for polydipsia and polyuria.   Genitourinary: Negative for dysuria, flank pain, hematuria and urgency.   Musculoskeletal: Negative for arthralgias, back pain, joint swelling and myalgias.   Skin: Negative for rash.   Neurological: Positive for weakness. Negative for dizziness, numbness and headaches.   Hematological: Negative for adenopathy.   Psychiatric/Behavioral: Negative for confusion and sleep disturbance. The patient is not nervous/anxious.            /70   Pulse 86   Temp 97.1 °F (36.2 °C) (Temporal)   Ht 162.6 cm (64.02\")   Wt 90.2 kg (198 lb 12.8 oz)   SpO2 97%   BMI 34.10 kg/m²       Objective     Physical Exam   Constitutional: She is oriented to person, place, and time. She appears well-developed and well-nourished.   HENT:   Head: Normocephalic and atraumatic.   Right Ear: External ear normal.   Left Ear: External ear normal.   Nose: Nose normal.   Mouth/Throat: Oropharynx is clear and moist.   Eyes: Conjunctivae and EOM are normal. Pupils are equal, round, and reactive to light.   Neck: Normal range of motion. Neck supple.   Cardiovascular: Normal rate, regular rhythm and normal heart sounds. Exam reveals no " gallop and no friction rub.   No murmur heard.  Pulmonary/Chest: Effort normal and breath sounds normal.   Abdominal: Soft. Bowel sounds are normal. She exhibits no distension. There is no tenderness. There is no rebound and no guarding.   Musculoskeletal: Normal range of motion. She exhibits no edema or deformity.   Neurological: She is alert and oriented to person, place, and time. No cranial nerve deficit.   Skin: Skin is warm and dry. No rash noted. No erythema.   Psychiatric: She has a normal mood and affect. Her behavior is normal. Judgment and thought content normal.   Nursing note and vitals reviewed.          PAST MEDICAL HISTORY     Past Medical History:   Diagnosis Date   • Abdominal pain    • Acid reflux    • Acute sinusitis    • Allergic rhinitis     Due to Pollen   • Asthma    • Astigmatism    • Cardiomyopathy (CMS/HCC)     post partum   • CHF (congestive heart failure) (CMS/HCC)    • Chronic pain    • Common cold    • Constipation    • Depressive disorder    • Diarrhea    • Dysfunction of eustachian tube    • Encounter for gynecological examination with abnormal finding    • Encounter for screening for infections with predominantly sexual mode of transmission    • Encounter for screening mammogram for malignant neoplasm of breast    • Excessive cerumen in ear canal    • Hypertensive disorder     hx   • Irregular menstruation    • Menopausal flushing    • Mood disorder (CMS/HCC)     Severe   • Myopia    • Nausea and vomiting    • Otalgia     Unspecified ear   • Presbyopia    • Sebaceous cyst    • Vaginal discharge    • Vaginal odor    • Venereal disease screening       PAST SURGICAL HISTORY     Past Surgical History:   Procedure Laterality Date   •  SECTION  2005   • INJECTION OF MEDICATION  2016    Phenergan   • OTHER SURGICAL HISTORY  2013    Removal of impacted cerumen   • PAP SMEAR     • TUBAL ABDOMINAL LIGATION        SOCIAL HISTORY     Social History      Socioeconomic History   • Marital status:      Spouse name: Not on file   • Number of children: Not on file   • Years of education: Not on file   • Highest education level: Not on file   Tobacco Use   • Smoking status: Former Smoker     Types: Electronic Cigarette, Cigarettes     Last attempt to quit: 2015     Years since quitting: 3.8   • Smokeless tobacco: Never Used   • Tobacco comment: 3 cigarettes per day. Electronic Cigarette daily.   Substance and Sexual Activity   • Alcohol use: No   • Drug use: No   • Sexual activity: No     Birth control/protection: Surgical      ALLERGIES   Adhesive tape and Paxil [paroxetine]   MEDICATIONS     Current Outpatient Medications   Medication Sig Dispense Refill   • albuterol (PROVENTIL HFA;VENTOLIN HFA) 108 (90 Base) MCG/ACT inhaler Inhale 2 puffs Every 4 (Four) Hours As Needed for Wheezing. 1 inhaler 11   • albuterol (PROVENTIL) (2.5 MG/3ML) 0.083% nebulizer solution Take 2.5 mg by nebulization Every 4 (Four) Hours As Needed for Wheezing or Shortness of Air. 120 vial 11   • amitriptyline (ELAVIL) 10 MG tablet Take 1-3 tablets by mouth Every Night. neuropathy 90 tablet 0   • aspirin 81 MG tablet Take 81 mg by mouth Daily.     • buPROPion XL (WELLBUTRIN XL) 300 MG 24 hr tablet Take 1 tablet by mouth Daily. 30 tablet 11   • carvedilol (COREG) 6.25 MG tablet Take 6.25 mg by mouth 2 (Two) Times a Day With Meals.     • clonazePAM (KlonoPIN) 2 MG tablet Take 2 mg by mouth 2 (Two) Times a Day As Needed.  3   • dicyclomine (BENTYL) 20 MG tablet TAKE 1 TABLET BY MOUTH EVERY 6 (SIX) HOURS. 120 tablet 11   • ergocalciferol (ERGOCALCIFEROL) 86489 units capsule Take 50,000 Units by mouth.     • fluticasone (FLONASE) 50 MCG/ACT nasal spray 2 sprays into the nostril(s) as directed by provider Daily As Needed for Allergies. Administer 2 sprays in each nostril for each dose. 1 bottle 11   • fluticasone-salmeterol (ADVAIR HFA) 115-21 MCG/ACT inhaler Inhale 2 puffs 2 (Two) Times a  Day. 12 g 11   • QUEtiapine XR (SEROquel XR) 400 MG 24 hr tablet Take 400 mg by mouth every night.     • raNITIdine (ZANTAC) 150 MG tablet Take 150 mg by mouth As Needed for Heartburn.     • sertraline (ZOLOFT) 100 MG tablet Take 2 tablets by mouth Daily. 60 tablet 11   • temazepam (RESTORIL) 15 MG capsule Take 15 mg by mouth At Night As Needed.  3   • valACYclovir (VALTREX) 1000 MG tablet Take 500 mg by mouth 2 (Two) Times a Day As Needed.     • guaifenesin-dextromethorphan (ROBITUSSIN DM) 100-10 MG/5ML syrup Take 10 mL by mouth 4 (Four) Times a Day As Needed for Cough. 240 mL 1   • loratadine (CLARITIN) 10 MG tablet Take 1 tablet by mouth Daily. 30 tablet 11   • MethylPREDNISolone (MEDROL, WING,) 4 MG tablet Take as directed on package instructions. 21 tablet 0     Current Facility-Administered Medications   Medication Dose Route Frequency Provider Last Rate Last Dose   • cyanocobalamin injection 1,000 mcg  1,000 mcg Intramuscular Q28 Days Benigno Hitchcock MD   1,000 mcg at 11/12/18 1528        The following portions of the patient's history were reviewed and updated as appropriate: allergies, current medications, past family history, past medical history, past social history, past surgical history and problem list.        Assessment/Plan   Chioma was seen today for generalized body aches, nausea and chest pain.    Diagnoses and all orders for this visit:    Body aches  -     POCT Influenza A/B    Acute URI    Other orders  -     loratadine (CLARITIN) 10 MG tablet; Take 1 tablet by mouth Daily.  -     MethylPREDNISolone (MEDROL, WING,) 4 MG tablet; Take as directed on package instructions.  -     guaifenesin-dextromethorphan (ROBITUSSIN DM) 100-10 MG/5ML syrup; Take 10 mL by mouth 4 (Four) Times a Day As Needed for Cough.      Flu neg                 No Follow-up on file.                  This document has been electronically signed by Benigno Hitchcock MD on November 19, 2018 4:20 PM

## 2018-12-03 RX ORDER — AMITRIPTYLINE HYDROCHLORIDE 10 MG/1
TABLET, FILM COATED ORAL
Qty: 90 TABLET | Refills: 0 | Status: SHIPPED | OUTPATIENT
Start: 2018-12-03 | End: 2019-10-10

## 2018-12-10 ENCOUNTER — CLINICAL SUPPORT (OUTPATIENT)
Dept: FAMILY MEDICINE CLINIC | Facility: CLINIC | Age: 43
End: 2018-12-10

## 2018-12-10 DIAGNOSIS — D51.9 ANEMIA DUE TO VITAMIN B12 DEFICIENCY, UNSPECIFIED B12 DEFICIENCY TYPE: Primary | ICD-10-CM

## 2018-12-10 PROCEDURE — 96372 THER/PROPH/DIAG INJ SC/IM: CPT | Performed by: FAMILY MEDICINE

## 2018-12-10 RX ADMIN — CYANOCOBALAMIN 1000 MCG: 1000 INJECTION, SOLUTION INTRAMUSCULAR; SUBCUTANEOUS at 15:37

## 2019-01-09 RX ORDER — ESOMEPRAZOLE MAGNESIUM 40 MG/1
CAPSULE, DELAYED RELEASE ORAL
Qty: 90 CAPSULE | Refills: 3 | Status: SHIPPED | OUTPATIENT
Start: 2019-01-09 | End: 2020-02-03

## 2019-02-21 ENCOUNTER — CLINICAL SUPPORT (OUTPATIENT)
Dept: FAMILY MEDICINE CLINIC | Facility: CLINIC | Age: 44
End: 2019-02-21

## 2019-02-21 DIAGNOSIS — D51.9 ANEMIA DUE TO VITAMIN B12 DEFICIENCY, UNSPECIFIED B12 DEFICIENCY TYPE: ICD-10-CM

## 2019-02-21 PROCEDURE — 96372 THER/PROPH/DIAG INJ SC/IM: CPT | Performed by: FAMILY MEDICINE

## 2019-02-21 RX ADMIN — CYANOCOBALAMIN 1000 MCG: 1000 INJECTION, SOLUTION INTRAMUSCULAR; SUBCUTANEOUS at 14:42

## 2019-08-01 ENCOUNTER — HOSPITAL ENCOUNTER (EMERGENCY)
Facility: HOSPITAL | Age: 44
Discharge: HOME | End: 2019-08-02
Attending: EMERGENCY MEDICINE | Admitting: EMERGENCY MEDICINE

## 2019-08-01 ENCOUNTER — APPOINTMENT (OUTPATIENT)
Dept: GENERAL RADIOLOGY | Facility: HOSPITAL | Age: 44
End: 2019-08-01

## 2019-08-01 VITALS
BODY MASS INDEX: 32.44 KG/M2 | WEIGHT: 190 LBS | OXYGEN SATURATION: 98 % | TEMPERATURE: 98.5 F | DIASTOLIC BLOOD PRESSURE: 78 MMHG | HEART RATE: 90 BPM | RESPIRATION RATE: 18 BRPM | HEIGHT: 64 IN | SYSTOLIC BLOOD PRESSURE: 132 MMHG

## 2019-08-01 DIAGNOSIS — F41.9 ANXIETY: ICD-10-CM

## 2019-08-01 DIAGNOSIS — R07.89 ATYPICAL CHEST PAIN: Primary | ICD-10-CM

## 2019-08-01 LAB
ALBUMIN SERPL-MCNC: 4.2 G/DL (ref 3.5–5.2)
ALBUMIN/GLOB SERPL: 1.3 G/DL
ALP SERPL-CCNC: 101 U/L (ref 39–117)
ALT SERPL W P-5'-P-CCNC: 32 U/L (ref 1–33)
ANION GAP SERPL CALCULATED.3IONS-SCNC: 13 MMOL/L (ref 5–15)
AST SERPL-CCNC: 31 U/L (ref 1–32)
B-HCG UR QL: NEGATIVE
BASOPHILS # BLD AUTO: 0.05 10*3/MM3 (ref 0–0.2)
BASOPHILS NFR BLD AUTO: 0.6 % (ref 0–1.5)
BILIRUB SERPL-MCNC: <0.2 MG/DL (ref 0.2–1.2)
BUN BLD-MCNC: 10 MG/DL (ref 6–20)
BUN/CREAT SERPL: 9.7 (ref 7–25)
CALCIUM SPEC-SCNC: 9.2 MG/DL (ref 8.6–10.5)
CHLORIDE SERPL-SCNC: 103 MMOL/L (ref 98–107)
CO2 SERPL-SCNC: 24 MMOL/L (ref 22–29)
CREAT BLD-MCNC: 1.03 MG/DL (ref 0.57–1)
D-DIMER, QUANTITATIVE (MAD,POW, STR): 307 NG/ML (FEU) (ref 0–470)
DEPRECATED RDW RBC AUTO: 40.4 FL (ref 37–54)
EOSINOPHIL # BLD AUTO: 0.13 10*3/MM3 (ref 0–0.4)
EOSINOPHIL NFR BLD AUTO: 1.5 % (ref 0.3–6.2)
ERYTHROCYTE [DISTWIDTH] IN BLOOD BY AUTOMATED COUNT: 13.2 % (ref 12.3–15.4)
GFR SERPL CREATININE-BSD FRML MDRD: 58 ML/MIN/1.73
GLOBULIN UR ELPH-MCNC: 3.2 GM/DL
GLUCOSE BLD-MCNC: 98 MG/DL (ref 65–99)
HCT VFR BLD AUTO: 37 % (ref 34–46.6)
HGB BLD-MCNC: 12.6 G/DL (ref 12–15.9)
HOLD SPECIMEN: NORMAL
HOLD SPECIMEN: NORMAL
IMM GRANULOCYTES # BLD AUTO: 0.03 10*3/MM3 (ref 0–0.05)
IMM GRANULOCYTES NFR BLD AUTO: 0.3 % (ref 0–0.5)
LYMPHOCYTES # BLD AUTO: 2.19 10*3/MM3 (ref 0.7–3.1)
LYMPHOCYTES NFR BLD AUTO: 24.6 % (ref 19.6–45.3)
MCH RBC QN AUTO: 29.2 PG (ref 26.6–33)
MCHC RBC AUTO-ENTMCNC: 34.1 G/DL (ref 31.5–35.7)
MCV RBC AUTO: 85.8 FL (ref 79–97)
MONOCYTES # BLD AUTO: 0.87 10*3/MM3 (ref 0.1–0.9)
MONOCYTES NFR BLD AUTO: 9.8 % (ref 5–12)
NEUTROPHILS # BLD AUTO: 5.65 10*3/MM3 (ref 1.7–7)
NEUTROPHILS NFR BLD AUTO: 63.2 % (ref 42.7–76)
NRBC BLD AUTO-RTO: 0 /100 WBC (ref 0–0.2)
NT-PROBNP SERPL-MCNC: 17.4 PG/ML (ref 5–450)
PLATELET # BLD AUTO: 246 10*3/MM3 (ref 140–450)
PMV BLD AUTO: 9.1 FL (ref 6–12)
POTASSIUM BLD-SCNC: 4 MMOL/L (ref 3.5–5.2)
PROT SERPL-MCNC: 7.4 G/DL (ref 6–8.5)
RBC # BLD AUTO: 4.31 10*6/MM3 (ref 3.77–5.28)
SODIUM BLD-SCNC: 140 MMOL/L (ref 136–145)
TROPONIN T SERPL-MCNC: <0.01 NG/ML (ref 0–0.03)
TROPONIN T SERPL-MCNC: <0.01 NG/ML (ref 0–0.03)
WBC NRBC COR # BLD: 8.92 10*3/MM3 (ref 3.4–10.8)
WHOLE BLOOD HOLD SPECIMEN: NORMAL
WHOLE BLOOD HOLD SPECIMEN: NORMAL

## 2019-08-01 PROCEDURE — 99284 EMERGENCY DEPT VISIT MOD MDM: CPT

## 2019-08-01 PROCEDURE — 84484 ASSAY OF TROPONIN QUANT: CPT | Performed by: EMERGENCY MEDICINE

## 2019-08-01 PROCEDURE — 93010 ELECTROCARDIOGRAM REPORT: CPT | Performed by: INTERNAL MEDICINE

## 2019-08-01 PROCEDURE — 80053 COMPREHEN METABOLIC PANEL: CPT | Performed by: EMERGENCY MEDICINE

## 2019-08-01 PROCEDURE — 71046 X-RAY EXAM CHEST 2 VIEWS: CPT

## 2019-08-01 PROCEDURE — 93005 ELECTROCARDIOGRAM TRACING: CPT | Performed by: EMERGENCY MEDICINE

## 2019-08-01 PROCEDURE — 83880 ASSAY OF NATRIURETIC PEPTIDE: CPT | Performed by: EMERGENCY MEDICINE

## 2019-08-01 PROCEDURE — 81025 URINE PREGNANCY TEST: CPT | Performed by: EMERGENCY MEDICINE

## 2019-08-01 PROCEDURE — 85379 FIBRIN DEGRADATION QUANT: CPT | Performed by: EMERGENCY MEDICINE

## 2019-08-01 PROCEDURE — 85025 COMPLETE CBC W/AUTO DIFF WBC: CPT | Performed by: EMERGENCY MEDICINE

## 2019-08-01 RX ORDER — ALPRAZOLAM 1 MG/1
1 TABLET ORAL 3 TIMES DAILY PRN
COMMUNITY

## 2019-08-01 RX ORDER — ASPIRIN 325 MG
325 TABLET ORAL ONCE
Status: COMPLETED | OUTPATIENT
Start: 2019-08-01 | End: 2019-08-01

## 2019-08-01 RX ORDER — SODIUM CHLORIDE 0.9 % (FLUSH) 0.9 %
10 SYRINGE (ML) INJECTION AS NEEDED
Status: DISCONTINUED | OUTPATIENT
Start: 2019-08-01 | End: 2019-08-02 | Stop reason: HOSPADM

## 2019-08-01 RX ORDER — SPIRONOLACTONE 25 MG/1
25 TABLET ORAL DAILY PRN
COMMUNITY
End: 2019-12-04

## 2019-08-01 RX ADMIN — ASPIRIN 325 MG: 325 TABLET ORAL at 21:50

## 2019-08-02 NOTE — ED PROVIDER NOTES
Subjective   43 years old female with history of cardiomyopathy, congestive heart failure presented to the ER with 2 days history of intermittent chest pressure/tightness of mild to moderate intensity without any significant aggravating or relieving factors, associated with some shortness of breath and dizziness/lightheadedness.  Last for a few minutes to hour and improves.   currently she denies any chest pain palpitations or shortness of breath, has palpitations occasionally.  No fever or chills reported.  Denies any dyspnea on exertion.  No cough fever or chills reported.        History provided by:  Patient      Review of Systems   Constitutional: Positive for fatigue. Negative for chills and fever.   HENT: Negative for congestion, postnasal drip, sinus pressure, sinus pain and trouble swallowing.    Respiratory: Positive for chest tightness and shortness of breath.    Cardiovascular: Positive for chest pain and palpitations.   Gastrointestinal: Negative for abdominal pain.   Genitourinary: Negative for flank pain.   Musculoskeletal: Negative for back pain.   Skin: Negative for color change.   Neurological: Negative for syncope and headaches.   Psychiatric/Behavioral: Negative for agitation.       Past Medical History:   Diagnosis Date   • Abdominal pain    • Acid reflux    • Acute sinusitis    • Allergic rhinitis     Due to Pollen   • Asthma    • Astigmatism    • Cardiomyopathy (CMS/HCC)     post partum   • CHF (congestive heart failure) (CMS/HCC)    • Chronic pain    • Common cold    • Constipation    • Depressive disorder    • Diarrhea    • Dysfunction of eustachian tube    • Encounter for gynecological examination with abnormal finding    • Encounter for screening for infections with predominantly sexual mode of transmission    • Encounter for screening mammogram for malignant neoplasm of breast    • Excessive cerumen in ear canal    • Hypertensive disorder     hx   • Irregular menstruation    • Menopausal  flushing    • Mood disorder (CMS/HCC)     Severe   • Myopia    • Nausea and vomiting    • Otalgia     Unspecified ear   • Presbyopia    • Sebaceous cyst    • Vaginal discharge    • Vaginal odor    • Venereal disease screening        Allergies   Allergen Reactions   • Adhesive Tape Rash   • Paxil [Paroxetine] Anxiety       Past Surgical History:   Procedure Laterality Date   •  SECTION  2005   • CHOLECYSTECTOMY N/A 2017    Procedure:  LAPAROSCOPIC  POSSIBLE OPEN CHOLECYSTECTOMYWITH INTRAOPERATIVE CHOLANGIOGRAM;  Surgeon: Corenll Garay MD;  [facility]: [facility];  Service:    • INJECTION OF MEDICATION  2016    Phenergan   • OTHER SURGICAL HISTORY  2013    Removal of impacted cerumen   • PAP SMEAR     • TUBAL ABDOMINAL LIGATION         Family History   Problem Relation Age of Onset   • Cancer Other    • COPD Other    • Diabetes Other    • Heart disease Other    • Hypertension Other    • Mental illness Other    • Osteoarthritis Other    • Stomach cancer Other    • Thyroid disease Other    • Depression Other    • Other Other         Black Lung       Social History     Socioeconomic History   • Marital status: Single     Spouse name: Not on file   • Number of children: Not on file   • Years of education: Not on file   • Highest education level: Not on file   Tobacco Use   • Smoking status: Former Smoker     Types: Electronic Cigarette, Cigarettes     Last attempt to quit:      Years since quittin.5   • Smokeless tobacco: Never Used   • Tobacco comment: 3 cigarettes per day. Electronic Cigarette daily.   Substance and Sexual Activity   • Alcohol use: No   • Drug use: No   • Sexual activity: No     Birth control/protection: Surgical           Objective   Physical Exam   Constitutional: She is oriented to person, place, and time. She appears well-developed and well-nourished.   HENT:   Head: Atraumatic.   Mouth/Throat: Oropharynx is clear and moist.   Eyes: EOM are normal.   Neck:  Normal range of motion. Neck supple.   Cardiovascular: Normal rate, regular rhythm and normal heart sounds.   Pulmonary/Chest: Effort normal. She has no wheezes. She has no rales.   Musculoskeletal: Normal range of motion.        Right lower leg: Normal.        Left lower leg: Normal.   Neurological: She is alert and oriented to person, place, and time.   Skin: Skin is warm. Capillary refill takes less than 2 seconds.   Psychiatric: Her mood appears anxious.   Nursing note and vitals reviewed.      ECG 12 Lead    Date/Time: 8/1/2019 9:08 PM  Performed by: Yovanny Duran MD  Authorized by: Yovanny Duran MD   Interpreted by physician  Rhythm: sinus rhythm  Rate: normal  BPM: 86  QRS axis: normal  Conduction: conduction normal  ST Segments: ST segments normal  T Waves: T waves normal  Clinical impression: normal ECG                 ED Course                  MDM  Number of Diagnoses or Management Options  Anxiety:   Atypical chest pain:   Diagnosis management comments: Ruled out acute coronary syndrome, pneumonia, pneumothorax, effusion.  Low risk for dissection.  She does not have any history of coronary artery disease but has cardiomyopathy.  She does have appointment with Dr. Anne in the next few days.  Patient remained asymptomatic and did not want anything for pain.  Patient also has anxiety and her symptoms get worse at times this could be the cause of her symptoms as well.  Of stress.  I do not think she needs observation admission at any further work-up and is being discharged with outpatient follow-up.  Discussed signs symptoms of worsening needing return to ER which she seems understanding.       Amount and/or Complexity of Data Reviewed  Clinical lab tests: ordered and reviewed  Tests in the radiology section of CPT®: ordered and reviewed      Labs Reviewed   COMPREHENSIVE METABOLIC PANEL - Abnormal; Notable for the following components:       Result Value    Creatinine 1.03 (*)     Total Bilirubin <0.2  (*)     eGFR Non  Amer 58 (*)     All other components within normal limits    Narrative:     GFR Normal >60  Chronic Kidney Disease <60  Kidney Failure <15   PREGNANCY, URINE - Normal   D-DIMER, QUANTITATIVE - Normal    Narrative:     Dimer values <500 ng/ml FEU are FDA approved as aid in diagnosis of deep venous thrombosis and pulmonary embolism.  This test should not be used in an exclusion strategy with pretest probability alone.    A recent guideline regarding diagnosis for pulmonary thromboembolism recommends an adjusted exclusion criterion of age x 10 ng/ml FEU for patients >50 years of age (Joan Intern Med 2015; 163: 701-711).   TROPONIN (IN-HOUSE) - Normal    Narrative:     Troponin T Reference Range:  <= 0.03 ng/mL-   Negative for AMI  >0.03 ng/mL-     Abnormal for myocardial necrosis.  Clinicians would have to utilize clinical acumen, EKG, Troponin and serial changes to determine if it is an Acute Myocardial Infarction or myocardial injury due to an underlying chronic condition.    BNP (IN-HOUSE) - Normal    Narrative:     Among patients with dyspnea, NT-proBNP is highly sensitive for the detection of acute congestive heart failure. In addition NT-proBNP of <300 pg/ml effectively rules out acute congestive heart failure with 99% negative predictive value.   CBC WITH AUTO DIFFERENTIAL - Normal   TROPONIN (IN-HOUSE) - Normal    Narrative:     Troponin T Reference Range:  <= 0.03 ng/mL-   Negative for AMI  >0.03 ng/mL-     Abnormal for myocardial necrosis.  Clinicians would have to utilize clinical acumen, EKG, Troponin and serial changes to determine if it is an Acute Myocardial Infarction or myocardial injury due to an underlying chronic condition.    RAINBOW DRAW    Narrative:     The following orders were created for panel order [place] Draw.  Procedure                               Abnormality         Status                     ---------                               -----------         ------                      Light Blue Top[[id number]]                                   Final result               Green Top (Gel)[[id number]]                                  Final result               Lavender Top[[id number]]                                     Final result               Gold Top - SST[[id number]]                                   Final result                 Please view results for these tests on the individual orders.   LIGHT BLUE TOP   GREEN TOP   LAVENDER TOP   GOLD TOP - SST   CBC AND DIFFERENTIAL    Narrative:     The following orders were created for panel order CBC & Differential.  Procedure                               Abnormality         Status                     ---------                               -----------         ------                     CBC Auto Differential[[id number]]        Normal              Final result                 Please view results for these tests on the individual orders.       Xr Chest 2 View    Result Date: 8/1/2019  Narrative: Chest 2 view on  8/1/2019 CLINICAL INDICATION: Shortness of breath COMPARISON: None FINDINGS: The lungs are clear. Cardiac, hilar and mediastinal contours are within normal limits. Pulmonary vascularity is within normal limits. No bony abnormality is noted.     Impression: No active disease. Electronically signed by:  Jeovany Miranda  8/1/2019 9:21 PM CDT Workstation: [id number]          Final diagnoses:   Atypical chest pain   Anxiety            Yovanny Duran MD  08/01/19 5087

## 2019-10-10 RX ORDER — SERTRALINE HYDROCHLORIDE 100 MG/1
200 TABLET, FILM COATED ORAL DAILY
Qty: 30 TABLET | Refills: 0 | Status: SHIPPED | OUTPATIENT
Start: 2019-10-10

## 2019-12-04 ENCOUNTER — OFFICE VISIT (OUTPATIENT)
Dept: FAMILY MEDICINE CLINIC | Facility: CLINIC | Age: 44
End: 2019-12-04

## 2019-12-04 VITALS
BODY MASS INDEX: 33.63 KG/M2 | HEART RATE: 90 BPM | WEIGHT: 197 LBS | OXYGEN SATURATION: 98 % | HEIGHT: 64 IN | SYSTOLIC BLOOD PRESSURE: 104 MMHG | DIASTOLIC BLOOD PRESSURE: 72 MMHG | TEMPERATURE: 96.6 F

## 2019-12-04 DIAGNOSIS — S29.011A MUSCLE STRAIN OF CHEST WALL, INITIAL ENCOUNTER: Primary | ICD-10-CM

## 2019-12-04 DIAGNOSIS — M62.838 MUSCLE SPASM: ICD-10-CM

## 2019-12-04 PROCEDURE — 99213 OFFICE O/P EST LOW 20 MIN: CPT | Performed by: NURSE PRACTITIONER

## 2019-12-04 RX ORDER — TIZANIDINE 4 MG/1
4 TABLET ORAL EVERY 8 HOURS PRN
Qty: 30 TABLET | Refills: 1 | Status: SHIPPED | OUTPATIENT
Start: 2019-12-04 | End: 2020-09-18

## 2019-12-04 NOTE — PROGRESS NOTES
"Subjective   Chioma Lin is a 44 y.o. female. Chest pain.    History of Present Illness   Chest Pain  Chioma Lin complains of chest pain. Onset was 1.5 days ago. Symptoms have been unchanged since that time. The patient's pain is intermittent. The patient describes the pain as \"pinching\" and does not radiate. Patient rates pain as a 6/10 in intensity. Associated symptoms are: none. Aggravating factors are: leaning forward, using upper body to do anything. Alleviating factors are: none. Patient's cardiac risk factors are: history of postpartum cardiomyopathy. Patient's risk factors for DVT/PE: none. Previous cardiac testing: echocardiogram and electrocardiogram (ECG). Saw Dr. Anne for chest pain and had EKG and echocardiogram last week, both were normal.     The following portions of the patient's history were reviewed and updated as appropriate: allergies, current medications, past family history, past medical history, past social history, past surgical history and problem list.    Review of Systems   Constitutional: Negative for chills, fatigue and fever.   HENT: Negative for congestion, ear pain, rhinorrhea and sore throat.    Eyes: Negative for blurred vision, double vision and visual disturbance.   Respiratory: Negative for cough, chest tightness, shortness of breath and wheezing.    Cardiovascular: Positive for chest pain (just below left collar bone). Negative for palpitations and leg swelling.   Gastrointestinal: Negative for abdominal pain, diarrhea, nausea and vomiting.   Endocrine: Negative for cold intolerance and heat intolerance.   Genitourinary: Negative for difficulty urinating, dysuria, frequency and hematuria.   Musculoskeletal: Negative for arthralgias, back pain, neck pain and neck stiffness.   Skin: Negative for dry skin, pallor, rash, skin lesions and bruise.   Allergic/Immunologic: Negative for environmental allergies, food allergies and immunocompromised state. "   Neurological: Negative for dizziness, syncope, weakness, light-headedness, headache and confusion.   Hematological: Negative for adenopathy. Does not bruise/bleed easily.   Psychiatric/Behavioral: Negative for self-injury, sleep disturbance, suicidal ideas, depressed mood and stress. The patient is not nervous/anxious.        Objective   Physical Exam   Constitutional: She is oriented to person, place, and time. She appears well-developed and well-nourished. She is cooperative. She does not have a sickly appearance. She does not appear ill. No distress.   HENT:   Head: Normocephalic.   Eyes: Conjunctivae, EOM and lids are normal.   Neck: Trachea normal, normal range of motion, full passive range of motion without pain and phonation normal. Neck supple. No thyroid mass and no thyromegaly present.   Cardiovascular: Normal rate, regular rhythm, S1 normal, S2 normal and normal heart sounds.   No murmur heard.  Pulmonary/Chest: Effort normal and breath sounds normal. No respiratory distress. She has no wheezes. She has no rhonchi. She has no rales.   Abdominal: Soft. Normal appearance. There is no tenderness.   Neurological: She is alert and oriented to person, place, and time. She has normal strength. No cranial nerve deficit. Coordination and gait normal.   Skin: Skin is warm, dry and intact. She is not diaphoretic. No pallor.   Psychiatric: She has a normal mood and affect. Her speech is normal and behavior is normal. Judgment and thought content normal. Cognition and memory are normal.     Vitals:    12/04/19 1331   BP: 104/72   Pulse: 90   Temp: 96.6 °F (35.9 °C)   SpO2: 98%         Assessment/Plan   Chioma was seen today for chest pain.    Diagnoses and all orders for this visit:    Muscle strain of chest wall, initial encounter  -     tiZANidine (ZANAFLEX) 4 MG tablet; Take 1 tablet by mouth Every 8 (Eight) Hours As Needed for Muscle Spasms.    Muscle spasm  -     tiZANidine (ZANAFLEX) 4 MG tablet; Take 1 tablet  by mouth Every 8 (Eight) Hours As Needed for Muscle Spasms.    Muscle strain of chest wall, initial encounter- Pt educated that it will take weeks to heal. Zanaflex should help decrease muscle spasms and pain. Pt instructed to take zanaflex 4mg every 8 hours PRN for muscle spasms. Rest, ice, and be careful to avoid further injury. Take tylenol PRN as directed for pain.   If symptoms do not improve or worsen, patient was instructed to return to clinic for further evaluation.         This document has been electronically signed by VAISHALI Smith on  December 5, 2019 9:17 AM

## 2019-12-04 NOTE — PATIENT INSTRUCTIONS
Nonspecific Chest Pain  Chest pain can be caused by many different conditions. Some causes of chest pain can be life-threatening. These will require treatment right away. Serious causes of chest pain include:  · Heart attack.  · A tear in the body's main blood vessel.  · Redness and swelling (inflammation) around your heart.  · Blood clot in your lungs.  Other causes of chest pain may not be so serious. These include:  · Heartburn.  · Anxiety or stress.  · Damage to bones or muscles in your chest.  · Lung infections.  Chest pain can feel like:  · Pain or discomfort in your chest.  · Crushing, pressure, aching, or squeezing pain.  · Burning or tingling.  · Dull or sharp pain that is worse when you move, cough, or take a deep breath.  · Pain or discomfort that is also felt in your back, neck, jaw, shoulder, or arm, or pain that spreads to any of these areas.  It is hard to know whether your pain is caused by something that is serious or something that is not so serious. So it is important to see your doctor right away if you have chest pain.  Follow these instructions at home:  Medicines  · Take over-the-counter and prescription medicines only as told by your doctor.  · If you were prescribed an antibiotic medicine, take it as told by your doctor. Do not stop taking the antibiotic even if you start to feel better.  Lifestyle    · Rest as told by your doctor.  · Do not use any products that contain nicotine or tobacco, such as cigarettes, e-cigarettes, and chewing tobacco. If you need help quitting, ask your doctor.  · Do not drink alcohol.  · Make lifestyle changes as told by your doctor. These may include:  ? Getting regular exercise. Ask your doctor what activities are safe for you.  ? Eating a heart-healthy diet. A diet and nutrition specialist (dietitian) can help you to learn healthy eating options.  ? Staying at a healthy weight.  ? Treating diabetes or high blood pressure, if needed.  ? Lowering your stress.  Activities such as yoga and relaxation techniques can help.  General instructions  · Pay attention to any changes in your symptoms. Tell your doctor about them or any new symptoms.  · Avoid any activities that cause chest pain.  · Keep all follow-up visits as told by your doctor. This is important. You may need more testing if your chest pain does not go away.  Contact a doctor if:  · Your chest pain does not go away.  · You feel depressed.  · You have a fever.  Get help right away if:  · Your chest pain is worse.  · You have a cough that gets worse, or you cough up blood.  · You have very bad (severe) pain in your belly (abdomen).  · You pass out (faint).  · You have either of these for no clear reason:  ? Sudden chest discomfort.  ? Sudden discomfort in your arms, back, neck, or jaw.  · You have shortness of breath at any time.  · You suddenly start to sweat, or your skin gets clammy.  · You feel sick to your stomach (nauseous).  · You throw up (vomit).  · You suddenly feel lightheaded or dizzy.  · You feel very weak or tired.  · Your heart starts to beat fast, or it feels like it is skipping beats.  These symptoms may be an emergency. Do not wait to see if the symptoms will go away. Get medical help right away. Call your local emergency services (911 in the [place] Do not drive yourself to the hospital.  Summary  · Chest pain can be caused by many different conditions. The cause may be serious and need treatment right away. If you have chest pain, see your doctor right away.  · Follow your doctor's instructions for taking medicines and making lifestyle changes.  · Keep all follow-up visits as told by your doctor. This includes visits for any further testing if your chest pain does not go away.  · Be sure to know the signs that show that your condition has become worse. Get help right away if you have these symptoms.  This information is not intended to replace advice given to you by your health care provider. Make  sure you discuss any questions you have with your health care provider.  Document Released: 06/05/2009 Document Revised: 06/20/2019 Document Reviewed: 06/20/2019  Elsevier Interactive Patient Education © 2019 [employer]  Muscle Strain  A muscle strain is an injury that occurs when a muscle is stretched beyond its normal length. Usually, a small number of muscle fibers are torn when this happens. There are three types of muscle strains. First-degree strains have the least amount of muscle fiber tearing and the least amount of pain. Second-degree and third-degree strains have more tearing and pain.  Usually, recovery from muscle strain takes 1-2 weeks. Complete healing normally takes 5-6 weeks.  What are the causes?  This condition is caused when a sudden, violent force is placed on a muscle and stretches it too far. This may occur with a fall, lifting, or sports.  What increases the risk?  This condition is more likely to develop in athletes and people who are physically active.  What are the signs or symptoms?  Symptoms of this condition include:  · Pain.  · Bruising.  · Swelling.  · Trouble using the muscle.  How is this diagnosed?  This condition is diagnosed based on a physical exam and your medical history. Tests may also be done, including an X-ray, ultrasound, or MRI.  How is this treated?  This condition is initially treated with PRICE therapy. This therapy involves:  · Protecting the muscle from being injured again.  · Resting the injured muscle.  · Icing the injured muscle.  · Applying pressure (compression) to the injured muscle. This may be done with a splint or elastic bandage.  · Raising (elevating) the injured muscle.  Your health care provider may also recommend medicine for pain.  Follow these instructions at home:  If you have a splint:  · Wear the splint as told by your health care provider. Remove it only as told by your health care provider.  · Loosen the splint if your fingers or toes tingle,  become numb, or turn cold and blue.  · Keep the splint clean.  · If the splint is not waterproof:  ? Do not let it get wet.  ? Cover it with a watertight covering when you take a bath or a shower.  Managing pain, stiffness, and swelling    · If directed, put ice on the injured area.  ? If you have a removable splint, remove it as told by your health care provider.  ? Put ice in a plastic bag.  ? Place a towel between your skin and the bag.  ? Leave the ice on for 20 minutes, 2-3 times a day.  · Move your fingers or toes often to avoid stiffness and to lessen swelling.  · Raise (elevate) the injured area above the level of your heart while you are sitting or lying down.  · Wear an elastic bandage as told by your health care provider. Make sure that it is not too tight.  General instructions  · Take over-the-counter and prescription medicines only as told by your health care provider.  · Restrict your activity and rest the injured muscle as told by your health care provider. Gentle movements may be allowed.  · If physical therapy was prescribed, do exercises as told by your health care provider.  · Do not put pressure on any part of the splint until it is fully hardened. This may take several hours.  · Do not use any products that contain nicotine or tobacco, such as cigarettes and e-cigarettes. These can delay bone healing. If you need help quitting, ask your health care provider.  · Ask your health care provider when it is safe to drive if you have a splint.  · Keep all follow-up visits as told by your health care provider. This is important.  How is this prevented?  · Warm up before exercising. This helps to prevent future muscle strains.  Contact a health care provider if:  · You have more pain or swelling in the injured area.  Get help right away if:  · You have numbness or tingling or lose a lot of strength in the injured area.  Summary  · A muscle strain is an injury that occurs when a muscle is stretched  beyond its normal length.  · This condition is caused when a sudden, violent force is placed on a muscle and stretches it too far.  · This condition is initially treated with PRICE therapy, which involves protecting, resting, icing, compressing, and elevating.  · Gentle movements may be allowed. If physical therapy was prescribed, do exercises as told by your health care provider.  This information is not intended to replace advice given to you by your health care provider. Make sure you discuss any questions you have with your health care provider.  Document Released: 12/18/2006 Document Revised: 01/24/2018 Document Reviewed: 01/24/2018  [employer] Interactive Patient Education © 2019 [employer]

## 2019-12-23 RX ORDER — ALBUTEROL SULFATE 90 UG/1
AEROSOL, METERED RESPIRATORY (INHALATION)
Qty: 18 G | Refills: 11 | Status: SHIPPED | OUTPATIENT
Start: 2019-12-23

## 2020-02-03 RX ORDER — ESOMEPRAZOLE MAGNESIUM 40 MG/1
CAPSULE, DELAYED RELEASE ORAL
Qty: 90 CAPSULE | Refills: 2 | Status: SHIPPED | OUTPATIENT
Start: 2020-02-03 | End: 2020-12-14

## 2020-05-20 DIAGNOSIS — R07.9 CHEST PAIN, UNSPECIFIED TYPE: Primary | ICD-10-CM

## 2020-06-11 RX ORDER — FLUTICASONE PROPIONATE AND SALMETEROL XINAFOATE 115; 21 UG/1; UG/1
AEROSOL, METERED RESPIRATORY (INHALATION)
Qty: 12 G | Refills: 0 | Status: SHIPPED | OUTPATIENT
Start: 2020-06-11

## 2020-06-22 ENCOUNTER — HOSPITAL ENCOUNTER (OUTPATIENT)
Dept: NUCLEAR MEDICINE | Facility: HOSPITAL | Age: 45
Discharge: HOME | End: 2020-06-22

## 2020-06-22 ENCOUNTER — HOSPITAL ENCOUNTER (OUTPATIENT)
Dept: CARDIOLOGY | Facility: HOSPITAL | Age: 45
Discharge: HOME | End: 2020-06-22

## 2020-06-22 DIAGNOSIS — R07.9 CHEST PAIN, UNSPECIFIED TYPE: ICD-10-CM

## 2020-06-22 LAB
BH CV STRESS BP STAGE 1: NORMAL
BH CV STRESS BP STAGE 2: NORMAL
BH CV STRESS DURATION MIN STAGE 1: 3
BH CV STRESS DURATION MIN STAGE 2: 1
BH CV STRESS DURATION SEC STAGE 1: 0
BH CV STRESS DURATION SEC STAGE 2: 32
BH CV STRESS GRADE STAGE 1: 10
BH CV STRESS GRADE STAGE 2: 12
BH CV STRESS HR STAGE 1: 144
BH CV STRESS HR STAGE 2: 157
BH CV STRESS METS STAGE 1: 5
BH CV STRESS METS STAGE 2: 7.5
BH CV STRESS PROTOCOL 1: NORMAL
BH CV STRESS RECOVERY BP: NORMAL MMHG
BH CV STRESS RECOVERY HR: 110 BPM
BH CV STRESS SPEED STAGE 1: 1.7
BH CV STRESS SPEED STAGE 2: 2.5
BH CV STRESS STAGE 1: 1
BH CV STRESS STAGE 2: 2
LV EF NUC BP: 86 %
MAXIMAL PREDICTED HEART RATE: 174 BPM
PERCENT MAX PREDICTED HR: 90.23 %
STRESS BASELINE BP: NORMAL MMHG
STRESS BASELINE HR: 99 BPM
STRESS PERCENT HR: 106 %
STRESS POST ESTIMATED WORKLOAD: 7 METS
STRESS POST EXERCISE DUR MIN: 4 MIN
STRESS POST EXERCISE DUR SEC: 31 SEC
STRESS POST PEAK BP: NORMAL MMHG
STRESS POST PEAK HR: 157 BPM
STRESS TARGET HR: 148 BPM

## 2020-06-22 PROCEDURE — 78452 HT MUSCLE IMAGE SPECT MULT: CPT

## 2020-06-22 PROCEDURE — A9500 TC99M SESTAMIBI: HCPCS | Performed by: INTERNAL MEDICINE

## 2020-06-22 PROCEDURE — 93017 CV STRESS TEST TRACING ONLY: CPT

## 2020-06-22 PROCEDURE — 0 TECHNETIUM SESTAMIBI: Performed by: INTERNAL MEDICINE

## 2020-06-22 RX ADMIN — TECHNETIUM TC 99M SESTAMIBI 1 DOSE: 1 INJECTION INTRAVENOUS at 09:14

## 2020-06-22 RX ADMIN — TECHNETIUM TC 99M SESTAMIBI 1 DOSE: 1 INJECTION INTRAVENOUS at 10:45

## 2020-06-22 NOTE — H&P
Cardiology History and Physical Note        Patient Name: Chioma Lin  Age/Sex: 44 y.o. female  : 1975  MRN: [id number]    Date of Admission : 2020    Primary care Physician: Melissa Biswas, VAISHALI    Reason for Admission: Chest pain exercise Cardiolite stress test    Subjective:       Chief Complaint: Chest pain      History of Present Illness:  Chioma Lin is a 44 y.o. 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      Patient presents for further evaluation for symptoms of chest pain and shortness of breath.  Patient complained of having symptoms of chest heaviness on and off.    Patient has had symptoms of chest pain and shortness of breath on and off.  Patient oxygen saturation has been 96%.  Patient on questioning does admit on drinking 2 to 3 cups of coffee.  Patient denies any symptoms of prolonged palpitation.    Patient resting heart rate is 104 bpm.  Patient blood pressure today is 140/100.patient blood pressure at home has been on the low side.  Patient on questioning denies any symptoms of lightheaded dizziness near syncope.    Patient resting EKG reveals sinus tachycardia with nonspecific ST-T wave changes.    Patient 10 point review of system except for what stated in the history of present illness and negative.    Concurrent  Medical History:  1.  Chest pain shortness of breath.  2.  Postpartum cardiomyopathy.  3.  History of atrial septal defect.  4.  Arterial hypertension.  5.  Hypertensive heart disease.  6.  Nonrheumatic mild mitral and nonrheumatic mild  tricuspid regurgitation.  7.  Obesity with a body mass index of 33.  8.  Anxiety and depression with mood disorder.  9.  Allergic rhinitis.  10.  Asthmatic bronchitis.  11.  Gastroesophageal reflux disease.      Past Surgical History:  1.   section.  2.  Pap smear.  3.  Abdominal tubal ligation.  4.  Cholecystectomy.  5.  Phenergan injection.        Family History: Family history significant for father and mother and sister was coronary artery disease.      Social History: Previous history of 1 pack/day tobacco abuse denies any alcohol intake.       Cardiac Risk factor:   1.  Perimenopausal.  2.  Obesity.  3.  Arterial hypertension.  4.  Previous history of tobacco abuse.  5.  Family history for coronary artery disease    Allergies:  Allergies   Allergen Reactions   • Adhesive Tape Rash   • Paxil [Paroxetine] Anxiety       Home Medication::  Prior to Admission medications    Medication Sig Start Date End Date Taking? Authorizing Provider   ADVAIR -21 MCG/ACT inhaler INHALE 2 PUFFS 2 (TWO) TIMES A DAY. 20   Benigno Hitchcock MD   albuterol (PROVENTIL) (2.5 MG/3ML) 0.083% nebulizer solution Take 2.5 mg by nebulization Every 4 (Four) Hours As Needed for Wheezing or Shortness of Air. 11/15/17   Benigno Hitchcock MD   ALPRAZolam (XANAX) 1 MG tablet Take 1 mg by mouth 3 (Three) Times a Day As Needed for Anxiety.    ProviderLorin MD   aspirin 81 MG tablet Take 81 mg by mouth Daily.    Lorin Cordoba MD   buPROPion XL (WELLBUTRIN XL) 300 MG 24 hr tablet Take 1 tablet by mouth Daily. 18   Benigno Hitchcock MD   carvedilol (COREG) 6.25 MG tablet Take 6.25 mg by mouth 2 (Two) Times a Day With Meals.    ProviderLorin MD   DIGOXIN PO Take  by mouth.    Lorin Cordoba MD   esomeprazole (nexIUM) 40 MG capsule TAKE 1 CAPSULE BY MOUTH DAILY. 2/3/20   Benigno Hitchcock MD   fluticasone (FLONASE) 50 MCG/ACT nasal spray 2 sprays into the nostril(s) as directed by provider Daily As  Needed for Allergies. Administer 2 sprays in each nostril for each dose. 9/10/18   Benigno Hitchcock MD   Iron-Vitamins (S.S.S. TONIC PO) Take 1 dose by mouth 1 (One) Time Per Week.    ProviderLorin MD   loratadine (CLARITIN) 10 MG tablet Take 1 tablet by mouth Daily. 11/19/18   Benigno Hitchcock MD   QUEtiapine XR (SEROquel XR) 400 MG 24 hr tablet Take 400 mg by mouth every night.    ProviderLorin MD   sertraline (ZOLOFT) 100 MG tablet TAKE 2 TABLETS BY MOUTH DAILY. 10/10/19   Leisa Jernigan APRN   tiZANidine (ZANAFLEX) 4 MG tablet Take 1 tablet by mouth Every 8 (Eight) Hours As Needed for Muscle Spasms. 12/4/19   Leisa Jernigan APRN   valACYclovir (VALTREX) 1000 MG tablet Take 500 mg by mouth 2 (Two) Times a Day As Needed. 3/5/13   ProviderLorin MD   VENTOLIN  (90 Base) MCG/ACT inhaler INHALE 2 PUFFS EVERY 4 (FOUR) HOURS AS NEEDED FOR WHEEZING. 12/23/19   Benigno Hitchcock MD         Review of Systems   Constitutional: Negative for chills, fever and unexpected weight change.   HENT: Negative for hearing loss and nosebleeds.    Eyes: Negative for visual disturbance.   Respiratory: Positive for chest tightness and shortness of breath. Negative for cough and wheezing.    Cardiovascular: Positive for chest pain. Negative for palpitations and leg swelling.   Gastrointestinal: Negative for abdominal pain, blood in stool, constipation, diarrhea, nausea and vomiting.   Endocrine: Negative for cold intolerance, heat intolerance, polydipsia, polyphagia and polyuria.   Genitourinary: Negative for hematuria.   Musculoskeletal: Negative for joint swelling, myalgias and neck pain.   Skin: Negative for color change, rash and wound.   Neurological: Negative for dizziness, seizures, syncope, light-headedness, numbness and headaches.   Hematological: Does not bruise/bleed easily.         Objective:   Height of 5 feet 3 inches weight of 185 pounds body mass index of 33 blood pressure 140/100 pulse  of 104 regular respiration of 18 oxygen saturation of 97%  LMP  (LMP Unknown)     Physical Exam   Constitutional: She is oriented to person, place, and time. She appears well-developed and well-nourished.   HENT:   Head: Normocephalic and atraumatic.   Left Ear: External ear normal.   Nose: Nose normal.   Mouth/Throat: Oropharynx is clear and moist.   Eyes: Pupils are equal, round, and reactive to light. Conjunctivae and EOM are normal. No scleral icterus.   Neck: Normal range of motion. Neck supple. No JVD present. No tracheal deviation present. No thyromegaly present.   Cardiovascular: Normal rate and regular rhythm.   Pulmonary/Chest: Breath sounds normal. No stridor.   Abdominal: Bowel sounds are normal.   Musculoskeletal: Normal range of motion.   Lymphadenopathy:     She has no cervical adenopathy.   Neurological: She is alert and oriented to person, place, and time. She has normal reflexes.   Skin: Skin is warm and dry.   Psychiatric: She has a normal mood and affect. Her behavior is normal. Judgment and thought content normal.       Lab Review:           Invalid input(s): LABALBU, PROT                                    EKG:   ECG/EMG Results (last 24 hours)     ** No results found for the last 24 hours. **          Imaging:  Imaging Results (Last 24 Hours)     ** No results found for the last 24 hours. **          I personally viewed and interpreted the patient's EKG/Telemetry data.    Assessment:   1.  Chest pain.  2.  History of postpartum cardiomyopathy.  3.  Arterial hypertension.  4.  Hypertensive heart disease.  5.  Obesity.          Plan:   1.  Chest pain.  Patient has symptoms of substernal chest pain associated with shortness of breath.  Patient has not had previous cardiac evaluation.  Patient has previous history of postpartum cardiomyopathy.  Patient last transthoracic echocardiogram had revealed improvement in the left ventricular systolic function.  Patient has been recommended to undergo an  exercise Cardiolite stress test to rule out stress-induced ischemia.  Risk-benefit treatment options for the exercise Cardiolite stress test was discussed with the patient and an informed consent was obtained.  Plan was to proceed with a exercise Cardiolite stress test on 6/22/2020.    2.  Arterial hypertension.  Patient blood pressure is 140/80.  Patient has been counseled to decrease her salt intake and to continue with the present dose of the Coreg.  If the patient blood pressure remain elevated patient would be started on lisinopril.    3.  Postpartum cardiomyopathy.  Patient last ejection fraction was 55%.  Patient had nonrheumatic mild mitral and nonrheumatic mild tricuspid regurgitation.  Clinically at the present time patient is euvolemic and not in congestive heart failure.  Patient would be continued on the present dose of the digoxin and carvedilol.  If the patient heart rate and blood pressure can tolerate would consider increasing the dose of the carvedilol.  Patient has had episodes of low blood pressure.    4.  Obesity with a body mass index of 33.  Patient has been counseled on weight reduction lifestyle modification and dietary restriction.  Patient has been explained the risk associated with obesity and the occurrence and progression of atherosclerotic coronary artery disease and peripheral vascular disease.    5.  Anxiety and depression.  Patient is currently on Xanax and Zoloft Seroquel Wellbutrin.  Patient has been followed by the primary care physician Melissa GLORIA.    The above plan of management were discussed with the patient      Time: time spent in face-to-face evaluation of greater than 55 minutes and interacting and formulating examining and discussing the plan with the patient with 50% of greater time spent in face-to-face interaction.    Cortez Anne MD  06/22/20  05:57  Dictated utilizing Dragon dictation.

## 2020-09-18 DIAGNOSIS — S29.011A MUSCLE STRAIN OF CHEST WALL, INITIAL ENCOUNTER: ICD-10-CM

## 2020-09-18 DIAGNOSIS — M62.838 MUSCLE SPASM: ICD-10-CM

## 2020-09-18 RX ORDER — TIZANIDINE 4 MG/1
4 TABLET ORAL EVERY 8 HOURS PRN
Qty: 30 TABLET | Refills: 0 | Status: SHIPPED | OUTPATIENT
Start: 2020-09-18 | End: 2020-12-14

## 2020-11-09 ENCOUNTER — TELEPHONE (OUTPATIENT)
Dept: FAMILY MEDICINE CLINIC | Facility: CLINIC | Age: 45
End: 2020-11-09

## 2020-11-09 NOTE — TELEPHONE ENCOUNTER
DELETE AFTER REVIEWING: Telephone encounter to be sent to the clinical pool     Caller: Chioma Lin    Relationship to patient: Self    Best call back number: [phone]    Concerns or Questions if Applicable:  WOULD LIKE A CALL BACK, TO SEE IF SHE NEEDS A TEST    Travel screen questions: COVID EXPOSURE

## 2020-11-10 NOTE — TELEPHONE ENCOUNTER
SPOKE TO PATIENT TO GET MORE INFORMATION.  SHE WAS WITH A PERSON FOR AN HOUR THAT FOUND OUT SHE/HE WAS EXPOSED TO SOMEONE WHO TESTED POSITIVE.  SHE WANTS YOUR OPINION OF IF SHE SHOULD BE TESTED OR WHAT TO DO DUE TO HER HEALTH ISSUES.

## 2020-12-14 DIAGNOSIS — S29.011A MUSCLE STRAIN OF CHEST WALL, INITIAL ENCOUNTER: ICD-10-CM

## 2020-12-14 DIAGNOSIS — M62.838 MUSCLE SPASM: ICD-10-CM

## 2020-12-14 RX ORDER — ESOMEPRAZOLE MAGNESIUM 40 MG/1
CAPSULE, DELAYED RELEASE ORAL
Qty: 90 CAPSULE | Refills: 0 | Status: SHIPPED | OUTPATIENT
Start: 2020-12-14 | End: 2021-07-15

## 2020-12-14 RX ORDER — TIZANIDINE 4 MG/1
TABLET ORAL
Qty: 30 TABLET | Refills: 0 | Status: SHIPPED | OUTPATIENT
Start: 2020-12-14

## 2020-12-14 RX ORDER — ESOMEPRAZOLE MAGNESIUM 40 MG/1
CAPSULE, DELAYED RELEASE ORAL
Qty: 90 CAPSULE | Refills: 0 | Status: SHIPPED | OUTPATIENT
Start: 2020-12-14 | End: 2020-12-14

## 2021-07-15 RX ORDER — ESOMEPRAZOLE MAGNESIUM 40 MG/1
CAPSULE, DELAYED RELEASE ORAL
Qty: 90 CAPSULE | Refills: 0 | Status: SHIPPED | OUTPATIENT
Start: 2021-07-15 | End: 2021-08-18

## 2021-08-18 RX ORDER — ESOMEPRAZOLE MAGNESIUM 40 MG/1
CAPSULE, DELAYED RELEASE ORAL
Qty: 90 CAPSULE | Refills: 0 | Status: SHIPPED | OUTPATIENT
Start: 2021-08-18 | End: 2022-03-28

## 2021-10-06 ENCOUNTER — OFFICE VISIT (OUTPATIENT)
Dept: FAMILY MEDICINE CLINIC | Facility: CLINIC | Age: 46
End: 2021-10-06

## 2021-10-06 VITALS
HEART RATE: 94 BPM | HEIGHT: 64 IN | TEMPERATURE: 95.5 F | DIASTOLIC BLOOD PRESSURE: 73 MMHG | SYSTOLIC BLOOD PRESSURE: 102 MMHG | BODY MASS INDEX: 30.7 KG/M2 | WEIGHT: 179.8 LBS | OXYGEN SATURATION: 98 %

## 2021-10-06 DIAGNOSIS — M54.9 ACUTE BACK PAIN, UNSPECIFIED BACK LOCATION, UNSPECIFIED BACK PAIN LATERALITY: Primary | ICD-10-CM

## 2021-10-06 PROCEDURE — 99213 OFFICE O/P EST LOW 20 MIN: CPT | Performed by: FAMILY MEDICINE

## 2021-10-06 PROCEDURE — 96372 THER/PROPH/DIAG INJ SC/IM: CPT | Performed by: FAMILY MEDICINE

## 2021-10-06 RX ORDER — CYCLOBENZAPRINE HCL 10 MG
5-10 TABLET ORAL 3 TIMES DAILY PRN
Qty: 90 TABLET | Refills: 0 | Status: SHIPPED | OUTPATIENT
Start: 2021-10-06

## 2021-10-06 RX ORDER — TRIAMCINOLONE ACETONIDE 40 MG/ML
80 INJECTION, SUSPENSION INTRA-ARTICULAR; INTRAMUSCULAR ONCE
Status: COMPLETED | OUTPATIENT
Start: 2021-10-06 | End: 2021-10-06

## 2021-10-06 RX ADMIN — TRIAMCINOLONE ACETONIDE 80 MG: 40 INJECTION, SUSPENSION INTRA-ARTICULAR; INTRAMUSCULAR at 17:11

## 2021-10-06 NOTE — PROGRESS NOTES
" Subjective   Chioma Lin is a 45 y.o. female.     Chief Complaint   Patient presents with   • Back Pain   • Neck Pain   • RIB PAIN             History of Present Illness     Sleeps on a couch.  Every other day back issues that go to her chest/sternum. No trauma, mva.       Review of Systems   Constitutional: Negative for chills, fatigue and fever.   HENT: Negative for congestion, ear discharge, ear pain, facial swelling, hearing loss, postnasal drip, rhinorrhea, sinus pressure, sore throat, trouble swallowing and voice change.    Eyes: Negative for discharge, redness and visual disturbance.   Respiratory: Negative for cough, chest tightness, shortness of breath and wheezing.    Cardiovascular: Negative for chest pain and palpitations.   Gastrointestinal: Negative for abdominal pain, blood in stool, constipation, diarrhea, nausea and vomiting.   Endocrine: Negative for polydipsia and polyuria.   Genitourinary: Negative for dysuria, flank pain, hematuria and urgency.   Musculoskeletal: Positive for back pain. Negative for arthralgias, joint swelling and myalgias.   Skin: Negative for rash.   Neurological: Negative for dizziness, weakness, numbness and headaches.   Hematological: Negative for adenopathy.   Psychiatric/Behavioral: Negative for confusion and sleep disturbance. The patient is not nervous/anxious.            /73 (BP Location: Left arm, Patient Position: Sitting, Cuff Size: Adult)   Pulse 94   Temp 95.5 °F (35.3 °C) (Temporal)   Ht 162.6 cm (64.02\")   Wt 81.6 kg (179 lb 12.8 oz)   LMP  (LMP Unknown)   SpO2 98%   BMI 30.85 kg/m²       Objective     Physical Exam  Vitals and nursing note reviewed.   Constitutional:       Appearance: Normal appearance. She is well-developed.   HENT:      Head: Normocephalic and atraumatic.      Right Ear: External ear normal.      Left Ear: External ear normal.      Nose: Nose normal.   Eyes:      Extraocular Movements: Extraocular movements intact.    "   Conjunctiva/sclera: Conjunctivae normal.      Pupils: Pupils are equal, round, and reactive to light.   Pulmonary:      Effort: Pulmonary effort is normal.   Musculoskeletal:         General: Normal range of motion.      Cervical back: Normal range of motion.   Neurological:      General: No focal deficit present.      Mental Status: She is alert and oriented to person, place, and time.   Psychiatric:         Behavior: Behavior normal.         Thought Content: Thought content normal.         Judgment: Judgment normal.             PAST MEDICAL HISTORY     Past Medical History:   Diagnosis Date   • Abdominal pain    • Acid reflux    • Acute sinusitis    • Allergic rhinitis     Due to Pollen   • Asthma    • Astigmatism    • Cardiomyopathy (HCC)     post partum   • CHF (congestive heart failure) ([facility])    • Chronic pain    • Common cold    • Constipation    • Depressive disorder    • Diarrhea    • Dysfunction of eustachian tube    • Encounter for gynecological examination with abnormal finding    • Encounter for screening for infections with predominantly sexual mode of transmission    • Encounter for screening mammogram for malignant neoplasm of breast    • Excessive cerumen in ear canal    • Hypertensive disorder     hx   • Irregular menstruation    • Menopausal flushing    • Mood disorder (HCC)     Severe   • Myopia    • Nausea and vomiting    • Otalgia     Unspecified ear   • Presbyopia    • Sebaceous cyst    • Vaginal discharge    • Vaginal odor    • Venereal disease screening       PAST SURGICAL HISTORY     Past Surgical History:   Procedure Laterality Date   •  SECTION  2005   • CHOLECYSTECTOMY N/A 2017    Procedure:  LAPAROSCOPIC  POSSIBLE OPEN CHOLECYSTECTOMYWITH INTRAOPERATIVE CHOLANGIOGRAM;  Surgeon: Cornell Garay MD;  Location: [facility];  Service:    • INJECTION OF MEDICATION  2016    Phenergan   • OTHER SURGICAL HISTORY  2013    Removal of impacted cerumen   • PAP SMEAR      • TUBAL ABDOMINAL LIGATION        SOCIAL HISTORY     Social History     Socioeconomic History   • Marital status: Single     Spouse name: Not on file   • Number of children: Not on file   • Years of education: Not on file   • Highest education level: Not on file   Tobacco Use   • Smoking status: Former Smoker     Packs/day: 1.00     Years: 17.00     Pack years: 17.00     Types: Electronic Cigarette, Cigarettes     Start date: 10/6/1998     Quit date:      Years since quittin.7   • Smokeless tobacco: Never Used   • Tobacco comment: 3 cigarettes per day. Electronic Cigarette daily.   Substance and Sexual Activity   • Alcohol use: No   • Drug use: No   • Sexual activity: Never     Birth control/protection: Surgical      ALLERGIES   Adhesive tape and Paxil [paroxetine]   MEDICATIONS     Current Outpatient Medications   Medication Sig Dispense Refill   • ADVAIR -21 MCG/ACT inhaler INHALE 2 PUFFS 2 (TWO) TIMES A DAY. 12 g 0   • albuterol (PROVENTIL) (2.5 MG/3ML) 0.083% nebulizer solution Take 2.5 mg by nebulization Every 4 (Four) Hours As Needed for Wheezing or Shortness of Air. 120 vial 11   • aspirin 81 MG tablet Take 81 mg by mouth Daily.     • buPROPion XL (WELLBUTRIN XL) 300 MG 24 hr tablet Take 1 tablet by mouth Daily. 30 tablet 11   • carvedilol (COREG) 6.25 MG tablet Take 6.25 mg by mouth 2 (Two) Times a Day With Meals.     • DIGOXIN PO Take  by mouth.     • esomeprazole (nexIUM) 40 MG capsule TAKE 1 CAPSULE BY MOUTH DAILY. 90 capsule 0   • fluticasone (FLONASE) 50 MCG/ACT nasal spray 2 sprays into the nostril(s) as directed by provider Daily As Needed for Allergies. Administer 2 sprays in each nostril for each dose. 1 bottle 11   • Iron-Vitamins (S.S.S. TONIC PO) Take 1 dose by mouth 1 (One) Time Per Week.     • QUEtiapine XR (SEROquel XR) 400 MG 24 hr tablet Take 400 mg by mouth every night.     • sertraline (ZOLOFT) 100 MG tablet TAKE 2 TABLETS BY MOUTH DAILY. 30 tablet 0   • valACYclovir  (VALTREX) 1000 MG tablet Take 500 mg by mouth 2 (Two) Times a Day As Needed.     • VENTOLIN  (90 Base) MCG/ACT inhaler INHALE 2 PUFFS EVERY 4 (FOUR) HOURS AS NEEDED FOR WHEEZING. 18 g 11   • ALPRAZolam (XANAX) 1 MG tablet Take 1 mg by mouth 3 (Three) Times a Day As Needed for Anxiety.     • cyclobenzaprine (FLEXERIL) 10 MG tablet Take 0.5-1 tablets by mouth 3 (Three) Times a Day As Needed for Muscle Spasms. 90 tablet 0   • loratadine (CLARITIN) 10 MG tablet Take 1 tablet by mouth Daily. 30 tablet 11   • tiZANidine (ZANAFLEX) 4 MG tablet TAKE 1 TABLET BY MOUTH EVERY 8 (EIGHT) HOURS AS NEEDED FOR MUSCLE SPASMS - - MAY CAUSE DROWSINESS 30 tablet 0     Current Facility-Administered Medications   Medication Dose Route Frequency Provider Last Rate Last Admin   • cyanocobalamin injection 1,000 mcg  1,000 mcg Intramuscular Q28 Days Benigno Hitchcock MD   1,000 mcg at 02/21/19 1442   • triamcinolone acetonide (KENALOG-40) injection 80 mg  80 mg Intramuscular Once Benigno Hitchcock MD            The following portions of the patient's history were reviewed and updated as appropriate: allergies, current medications, past family history, past medical history, past social history, past surgical history and problem list.        Assessment/Plan   Diagnoses and all orders for this visit:    1. Acute back pain, unspecified back location, unspecified back pain laterality (Primary)  -     triamcinolone acetonide (KENALOG-40) injection 80 mg    Other orders  -     cyclobenzaprine (FLEXERIL) 10 MG tablet; Take 0.5-1 tablets by mouth 3 (Three) Times a Day As Needed for Muscle Spasms.  Dispense: 90 tablet; Refill: 0      She needs to stop sleeping on couch.  Shown a bed to get.  Chiropractor.                  No follow-ups on file.                  This document has been electronically signed by Benigno Hitchcock MD on October 6, 2021 16:39 CDT

## 2021-10-08 ENCOUNTER — TELEPHONE (OUTPATIENT)
Dept: FAMILY MEDICINE CLINIC | Facility: CLINIC | Age: 46
End: 2021-10-08

## 2021-10-08 NOTE — TELEPHONE ENCOUNTER
Ms. Lin called and said that she seen you on the 6th, and that she is not any better.  She is having acute muscle spasms.  She has an appt. With Dr. Salazar on Tues. At 1:45 but does not know if she can make until then.  Does not know what to do.

## 2022-03-28 RX ORDER — ESOMEPRAZOLE MAGNESIUM 40 MG/1
CAPSULE, DELAYED RELEASE ORAL
Qty: 90 CAPSULE | Refills: 1 | Status: SHIPPED | OUTPATIENT
Start: 2022-03-28 | End: 2022-08-08

## 2022-08-08 RX ORDER — ESOMEPRAZOLE MAGNESIUM 40 MG/1
CAPSULE, DELAYED RELEASE ORAL
Qty: 90 CAPSULE | Refills: 3 | Status: SHIPPED | OUTPATIENT
Start: 2022-08-08

## 2023-03-27 RX ORDER — ERGOCALCIFEROL 1.25 MG/1
CAPSULE ORAL
Qty: 4 CAPSULE | Refills: 5 | OUTPATIENT
Start: 2023-03-27

## 2023-08-16 RX ORDER — ESOMEPRAZOLE MAGNESIUM 40 MG/1
CAPSULE, DELAYED RELEASE ORAL
Qty: 90 CAPSULE | Refills: 2 | Status: SHIPPED | OUTPATIENT
Start: 2023-08-16

## (undated) DEVICE — GLV SURG SENSICARE GREEN W/ALOE PF LF 8 STRL

## (undated) DEVICE — SOL IRRIG NACL 1000ML

## (undated) DEVICE — MONOPOLAR METZENBAUM SCISSOR TIP, DISPOSABLE: Brand: MONOPOLAR METZENBAUM SCISSOR TIP, DISPOSABLE

## (undated) DEVICE — ADHS LIQ MASTISOL 2/3ML

## (undated) DEVICE — ENDOPATH XCEL BLADELESS TROCARS WITH STABILITY SLEEVES: Brand: ENDOPATH XCEL

## (undated) DEVICE — DECANT BG O JET

## (undated) DEVICE — GOWN,AURORA,NOREINF,RAGLAN,XL,STERILE: Brand: MEDLINE

## (undated) DEVICE — ANTIBACTERIAL UNDYED BRAIDED (POLYGLACTIN 910), SYNTHETIC ABSORBABLE SUTURE: Brand: COATED VICRYL

## (undated) DEVICE — GLV SURG SENSICARE GREEN W/ALOE PF LF 7 STRL

## (undated) DEVICE — STERILE POLYISOPRENE POWDER-FREE SURGICAL GLOVES: Brand: PROTEXIS

## (undated) DEVICE — RETRV BG SPECI ENDOBAG 3X6IN 20.9CM

## (undated) DEVICE — GLV SURG TRIUMPH LT PF LTX 7.5 STRL

## (undated) DEVICE — 3M™ STERI-STRIP™ REINFORCED ADHESIVE SKIN CLOSURES, R1547, 1/2 IN X 4 IN (12 MM X 100 MM), 6 STRIPS/ENVELOPE: Brand: 3M™ STERI-STRIP™

## (undated) DEVICE — GLV SURG TRIUMPH PF LTX 6.5 STRL

## (undated) DEVICE — STERILE POLYISOPRENE POWDER-FREE SURGICAL GLOVES WITH EMOLLIENT COATING: Brand: PROTEXIS

## (undated) DEVICE — PK LAP CHOLE LF 60

## (undated) DEVICE — THE KUMAR CATHETER®, USED IN CONJUNCTION WITH KUMAR CHOLANGIOGRAPHY® CLAMP, IS MEANT TO PROVIDE A MEANS OF LAPAROSCOPIC CHOLANGIOGRAPHY. IT COMPRISES A TRANSLUCENT TUBING ( 76 CM. LENGTH AND 16 GA. ) THAT CARRIES A 19 GA., 1.25 CM LONG NEEDLE AT THE END. THE KUMAR CATHETER® IS USED TO PUNCTURE THE HARTMANN'S POUCH OF THE GALLBLADDER FOR BILIARY ACCESS AND / OR ASPIRATION. PRODUCT IS LATEX FREE.: Brand: KUMAR CATHETER®